# Patient Record
Sex: MALE | Race: WHITE | NOT HISPANIC OR LATINO | URBAN - METROPOLITAN AREA
[De-identification: names, ages, dates, MRNs, and addresses within clinical notes are randomized per-mention and may not be internally consistent; named-entity substitution may affect disease eponyms.]

---

## 2021-04-13 ENCOUNTER — WEB ENCOUNTER (OUTPATIENT)
Dept: URBAN - METROPOLITAN AREA CLINIC 94 | Facility: CLINIC | Age: 41
End: 2021-04-13

## 2021-04-13 ENCOUNTER — LAB OUTSIDE AN ENCOUNTER (OUTPATIENT)
Dept: URBAN - METROPOLITAN AREA CLINIC 94 | Facility: CLINIC | Age: 41
End: 2021-04-13

## 2021-04-13 ENCOUNTER — OFFICE VISIT (OUTPATIENT)
Dept: URBAN - METROPOLITAN AREA CLINIC 94 | Facility: CLINIC | Age: 41
End: 2021-04-13
Payer: MEDICARE

## 2021-04-13 DIAGNOSIS — R60.0 BILATERAL EDEMA OF LOWER EXTREMITY: ICD-10-CM

## 2021-04-13 DIAGNOSIS — F10.10 ALCOHOL ABUSE: ICD-10-CM

## 2021-04-13 DIAGNOSIS — R60.9 EDEMA, UNSPECIFIED TYPE: ICD-10-CM

## 2021-04-13 DIAGNOSIS — K70.31 ALCOHOLIC CIRRHOSIS OF LIVER WITH ASCITES: ICD-10-CM

## 2021-04-13 PROBLEM — 15167005: Status: ACTIVE | Noted: 2021-04-13

## 2021-04-13 PROCEDURE — 99204 OFFICE O/P NEW MOD 45 MIN: CPT | Performed by: INTERNAL MEDICINE

## 2021-04-13 RX ORDER — METOPROLOL TARTRATE 25 MG/1
1 TABLET WITH FOOD TABLET, FILM COATED ORAL TWICE A DAY
Status: ACTIVE | COMMUNITY

## 2021-04-13 RX ORDER — SERTRALINE 50 MG/1
1 TABLET TABLET, FILM COATED ORAL ONCE A DAY
Status: ACTIVE | COMMUNITY

## 2021-04-13 RX ORDER — AMLODIPINE BESYLATE 5 MG/1
1 TABLET TABLET ORAL ONCE A DAY
Status: ACTIVE | COMMUNITY

## 2021-04-13 RX ORDER — SPIRONOLACTONE 50 MG/1
1 TABLET TABLET, FILM COATED ORAL ONCE A DAY
Status: ACTIVE | COMMUNITY

## 2021-04-13 RX ORDER — SPIRONOLACTONE 100 MG/1
1 TABLET TABLET, FILM COATED ORAL ONCE A DAY
Qty: 30 | Refills: 6
End: 2021-11-08

## 2021-04-13 RX ORDER — FAMOTIDINE 40 MG/1
1 TABLET AT BEDTIME TABLET, FILM COATED ORAL ONCE A DAY
Status: ACTIVE | COMMUNITY

## 2021-04-13 RX ORDER — CLONAZEPAM 0.5 MG/1
1 TABLET AT BEDTIME TABLET ORAL
Status: ACTIVE | COMMUNITY

## 2021-04-13 RX ORDER — FUROSEMIDE 40 MG/1
1 TABLET TABLET ORAL ONCE A DAY
Qty: 30 | Refills: 6 | OUTPATIENT

## 2021-04-13 NOTE — PHYSICAL EXAM GASTROINTESTINAL
Abdomen , MODERATE ASCITES, MILD DIFFUSE TENDERNESS. NO REBOUND TENDERNESS. NO PERITONEAL SIGNS. BS +

## 2021-04-13 NOTE — HPI-TODAY'S VISIT:
Pt presents for evaluation of decompensated alcoholic cirrhosis with ascites and edema. Pt has a history of heavy alcohol abuse and unfortunately still continues to drink despite been told on multiple ocassions to quit drinking. Drinks 6 drinks/day. Pt c/o abdominal and leg swelling for several months, which is getting worse. Denies abdominal pain, rectal bleeding, melena or weight loss. Recently evaluated at [facility] ER 4/6/21 for large testicular hydrocele for which he follows with Dr Elliott from Urology. Labs from 4/6/21 showed Bilirubin 4.8, , ALT 53, Alkphos 280. WBC 9.3, hgb 12.5, Plts 145. PT 17.0, inr 1.37. Hepatitis discriminant function 23.  Pt started on aldactone 50 mg/day which ha snot helped with ascites and edema so far.

## 2021-04-13 NOTE — PHYSICAL EXAM CONSTITUTIONAL:
Great to meet you.    We recommend having you sign a release of information to obtain your biopsy results.    We can have you start topical 5% minoxidil foam in the meantime.     You may want to discuss the necessity of the venlafaxine with the physician who prescribed this for you and consider an alternative medication.     Please continue with very good photoprotection for the face to avoid increase in dyspigmentation    well developed, well nourished , in no acute distress , ambulating without difficulty , normal communication ability

## 2021-05-11 ENCOUNTER — LAB OUTSIDE AN ENCOUNTER (OUTPATIENT)
Dept: URBAN - METROPOLITAN AREA CLINIC 94 | Facility: CLINIC | Age: 41
End: 2021-05-11

## 2021-05-11 ENCOUNTER — OFFICE VISIT (OUTPATIENT)
Dept: URBAN - METROPOLITAN AREA CLINIC 94 | Facility: CLINIC | Age: 41
End: 2021-05-11
Payer: MEDICARE

## 2021-05-11 DIAGNOSIS — K70.31 ALCOHOLIC CIRRHOSIS OF LIVER WITH ASCITES: ICD-10-CM

## 2021-05-11 PROCEDURE — 99214 OFFICE O/P EST MOD 30 MIN: CPT | Performed by: INTERNAL MEDICINE

## 2021-05-11 RX ORDER — CLONAZEPAM 0.5 MG/1
1 TABLET AT BEDTIME TABLET ORAL
Status: ACTIVE | COMMUNITY

## 2021-05-11 RX ORDER — SERTRALINE 50 MG/1
1 TABLET TABLET, FILM COATED ORAL ONCE A DAY
Status: ACTIVE | COMMUNITY

## 2021-05-11 RX ORDER — FAMOTIDINE 40 MG/1
1 TABLET AT BEDTIME TABLET, FILM COATED ORAL ONCE A DAY
Status: ACTIVE | COMMUNITY

## 2021-05-11 RX ORDER — AMLODIPINE BESYLATE 5 MG/1
1 TABLET TABLET ORAL ONCE A DAY
Status: ACTIVE | COMMUNITY

## 2021-05-11 RX ORDER — FUROSEMIDE 40 MG/1
1 TABLET TABLET ORAL ONCE A DAY
Qty: 30 | Refills: 6 | Status: ACTIVE | COMMUNITY

## 2021-05-11 RX ORDER — SPIRONOLACTONE 100 MG/1
1 TABLET TABLET, FILM COATED ORAL ONCE A DAY
Qty: 30 | Refills: 6 | Status: ACTIVE | COMMUNITY
End: 2021-11-08

## 2021-05-11 RX ORDER — SPIRONOLACTONE 100 MG/1
1 TABLET TABLET, FILM COATED ORAL TWICE A DAY
Qty: 60 TABLET | Refills: 6

## 2021-05-11 RX ORDER — METOPROLOL TARTRATE 25 MG/1
1 TABLET WITH FOOD TABLET, FILM COATED ORAL TWICE A DAY
Status: ACTIVE | COMMUNITY

## 2021-05-11 NOTE — HPI-TODAY'S VISIT:
Pt presents for evaluation of decompensated alcoholic cirrhosis with ascites and edema. Pt has a history of heavy alcohol abuse and unfortunately still continues to drink despite been told on multiple ocassions to quit drinking. Pt c/o abdominal and leg swelling for several months, which is getting worse. Denies abdominal pain, rectal bleeding, melena or weight loss. Recently evaluated at [facility] ER 4/6/21 for large testicular hydrocele for which he follows with Dr Elliott from Urology.  Labs from 4/6/21 showed Bilirubin 4.8, , ALT 53, Alkphos 280. WBC 9.3, hgb 12.5, Plts 145. PT 17.0, inr 1.37. Hepatitis discriminant function 23.  Pt had f/u CT A/P which showed cirrhosis with large ascites. Lasix was increased to 40 mg/day and aldactone to 100 mg/day. Pt c/o ascites and edema, not getting any better.

## 2021-05-12 LAB
A/G RATIO: 0.8
ALBUMIN: 2.7
ALKALINE PHOSPHATASE: 185
ALT (SGPT): 19
AST (SGOT): 46
BASO (ABSOLUTE): 0.1
BASOS: 2
BILIRUBIN, TOTAL: 1.8
BUN/CREATININE RATIO: 9
BUN: 7
CALCIUM: 8.5
CARBON DIOXIDE, TOTAL: 20
CHLORIDE: 102
CREATININE: 0.79
EGFR IF AFRICN AM: 130
EGFR IF NONAFRICN AM: 112
EOS (ABSOLUTE): 0.6
EOS: 7
GLOBULIN, TOTAL: 3.2
GLUCOSE: 122
HEMATOCRIT: 31.1
HEMATOLOGY COMMENTS:: (no result)
HEMOGLOBIN A1C: 4.5
HEMOGLOBIN: 10.8
IMMATURE CELLS: (no result)
IMMATURE GRANS (ABS): 0
IMMATURE GRANULOCYTES: 1
INR: 1.2
LYMPHS (ABSOLUTE): 1.6
LYMPHS: 19
MCH: 37.2
MCHC: 34.7
MCV: 107
MONOCYTES(ABSOLUTE): 1.1
MONOCYTES: 14
NEUTROPHILS (ABSOLUTE): 4.7
NEUTROPHILS: 57
NRBC: (no result)
PLATELETS: 189
POTASSIUM: 4.6
PROTEIN, TOTAL: 5.9
PROTHROMBIN TIME: 12.5
RBC: 2.9
RDW: 12
SODIUM: 135
WBC: 8.2

## 2021-05-13 ENCOUNTER — OFFICE VISIT (OUTPATIENT)
Dept: URBAN - METROPOLITAN AREA CLINIC 94 | Facility: CLINIC | Age: 41
End: 2021-05-13

## 2021-05-19 ENCOUNTER — TELEPHONE ENCOUNTER (OUTPATIENT)
Dept: URBAN - METROPOLITAN AREA CLINIC 94 | Facility: CLINIC | Age: 41
End: 2021-05-19

## 2021-06-21 ENCOUNTER — OFFICE VISIT (OUTPATIENT)
Dept: URBAN - METROPOLITAN AREA CLINIC 94 | Facility: CLINIC | Age: 41
End: 2021-06-21
Payer: MEDICARE

## 2021-06-21 ENCOUNTER — TELEPHONE ENCOUNTER (OUTPATIENT)
Dept: URBAN - METROPOLITAN AREA CLINIC 96 | Facility: CLINIC | Age: 41
End: 2021-06-21

## 2021-06-21 DIAGNOSIS — K70.31 ALCOHOLIC CIRRHOSIS OF LIVER WITH ASCITES: ICD-10-CM

## 2021-06-21 PROCEDURE — 99203 OFFICE O/P NEW LOW 30 MIN: CPT | Performed by: INTERNAL MEDICINE

## 2021-06-21 RX ORDER — AMLODIPINE BESYLATE 5 MG/1
1 TABLET TABLET ORAL ONCE A DAY
Status: ACTIVE | COMMUNITY

## 2021-06-21 RX ORDER — FUROSEMIDE 40 MG/1
1 TABLET TABLET ORAL ONCE A DAY
Qty: 30 | Refills: 6 | Status: ON HOLD | COMMUNITY

## 2021-06-21 RX ORDER — SPIRONOLACTONE 100 MG/1
1 TABLET TABLET, FILM COATED ORAL TWICE A DAY
Qty: 60 TABLET | Refills: 6 | Status: ACTIVE | COMMUNITY

## 2021-06-21 RX ORDER — FAMOTIDINE 40 MG/1
1 TABLET AT BEDTIME TABLET, FILM COATED ORAL ONCE A DAY
Status: ACTIVE | COMMUNITY

## 2021-06-21 RX ORDER — METOPROLOL TARTRATE 25 MG/1
1 TABLET WITH FOOD TABLET, FILM COATED ORAL TWICE A DAY
Status: ACTIVE | COMMUNITY

## 2021-06-21 RX ORDER — SERTRALINE 50 MG/1
1 TABLET TABLET, FILM COATED ORAL ONCE A DAY
Status: ACTIVE | COMMUNITY

## 2021-06-21 RX ORDER — CLONAZEPAM 0.5 MG/1
1 TABLET AT BEDTIME TABLET ORAL
Status: ACTIVE | COMMUNITY

## 2021-06-21 RX ORDER — FUROSEMIDE 40 MG/1
1 TABLET TABLET ORAL ONCE A DAY
Qty: 30 | Refills: 11 | OUTPATIENT
Start: 2021-06-21

## 2021-06-21 NOTE — PHYSICAL EXAM GASTROINTESTINAL
Abdomen , soft, nontender, very distended and tense no guarding or rigidity , no masses palpable , normal bowel sounds , Liver and Spleen , no hepatomegaly present , no hepatosplenomegaly , liver nontender , spleen not palpable  Has pedal edema

## 2021-06-21 NOTE — HPI-TODAY'S VISIT:
S-Wt went up 3 lb.Still drinks ETOH, tho "less" Belly swollen. Mild abd pain. States "only a little salt" intake. On spironolactone 200/d. Furosemide "not at pharmacy" Dr Crandall advised him no liver transplant unless dry 6 months Pt presents for evaluation of decompensated alcoholic cirrhosis with ascites and edema. Pt has a history of heavy alcohol abuse and unfortunately still continues to drink despite been told on multiple ocassions to quit drinking. Pt c/o abdominal and leg swelling for several months, which is getting worse. Denies abdominal pain, rectal bleeding, melena or weight loss. Recently evaluated at [facility] ER 4/6/21 for large testicular hydrocele for which he follows with Dr Elliott from Urology.  Labs from 4/6/21 showed Bilirubin 4.8, , ALT 53, Alkphos 280. WBC 9.3, hgb 12.5, Plts 145. PT 17.0, inr 1.37. Hepatitis discriminant function 23. MELD on last lab-11 Pt had f/u CT A/P which showed cirrhosis with large ascites. Pt c/o ascites and edema, not getting any better.

## 2021-06-22 LAB
A/G RATIO: 0.7
ALBUMIN: 3
ALKALINE PHOSPHATASE: 200
ALT (SGPT): 16
AST (SGOT): 42
BILIRUBIN, TOTAL: 1.7
BUN/CREATININE RATIO: 8
BUN: 6
CALCIUM: 8.6
CARBON DIOXIDE, TOTAL: 18
CHLORIDE: 95
CREATININE: 0.75
EGFR IF AFRICN AM: 133
EGFR IF NONAFRICN AM: 115
GLOBULIN, TOTAL: 4.2
GLUCOSE: 84
POTASSIUM: 5
PROTEIN, TOTAL: 7.2
SODIUM: 127

## 2021-07-28 ENCOUNTER — LAB OUTSIDE AN ENCOUNTER (OUTPATIENT)
Dept: URBAN - METROPOLITAN AREA CLINIC 94 | Facility: CLINIC | Age: 41
End: 2021-07-28

## 2021-07-28 ENCOUNTER — OFFICE VISIT (OUTPATIENT)
Dept: URBAN - METROPOLITAN AREA CLINIC 94 | Facility: CLINIC | Age: 41
End: 2021-07-28
Payer: MEDICARE

## 2021-07-28 VITALS
HEART RATE: 103 BPM | HEIGHT: 71 IN | WEIGHT: 309 LBS | DIASTOLIC BLOOD PRESSURE: 57 MMHG | BODY MASS INDEX: 43.26 KG/M2 | TEMPERATURE: 98.1 F | SYSTOLIC BLOOD PRESSURE: 134 MMHG

## 2021-07-28 DIAGNOSIS — K70.31 ALCOHOLIC CIRRHOSIS OF LIVER WITH ASCITES: ICD-10-CM

## 2021-07-28 DIAGNOSIS — R18.8 OTHER ASCITES: ICD-10-CM

## 2021-07-28 PROCEDURE — 99214 OFFICE O/P EST MOD 30 MIN: CPT | Performed by: INTERNAL MEDICINE

## 2021-07-28 RX ORDER — METOPROLOL TARTRATE 25 MG/1
1 TABLET WITH FOOD TABLET, FILM COATED ORAL TWICE A DAY
Status: ACTIVE | COMMUNITY

## 2021-07-28 RX ORDER — FUROSEMIDE 40 MG/1
1 TABLET TABLET ORAL ONCE A DAY
Qty: 30 | Refills: 6 | Status: DISCONTINUED | COMMUNITY

## 2021-07-28 RX ORDER — CLONAZEPAM 0.5 MG/1
1 TABLET AT BEDTIME TABLET ORAL
Status: ACTIVE | COMMUNITY

## 2021-07-28 RX ORDER — SPIRONOLACTONE 100 MG/1
1 TABLET TABLET, FILM COATED ORAL TWICE A DAY
Qty: 60 TABLET | Refills: 6 | Status: ACTIVE | COMMUNITY

## 2021-07-28 RX ORDER — AMLODIPINE BESYLATE 5 MG/1
1 TABLET TABLET ORAL ONCE A DAY
Status: ACTIVE | COMMUNITY

## 2021-07-28 RX ORDER — SERTRALINE 50 MG/1
1 TABLET TABLET, FILM COATED ORAL ONCE A DAY
Status: ACTIVE | COMMUNITY

## 2021-07-28 RX ORDER — FAMOTIDINE 40 MG/1
1 TABLET AT BEDTIME TABLET, FILM COATED ORAL ONCE A DAY
Status: ACTIVE | COMMUNITY

## 2021-07-28 RX ORDER — FUROSEMIDE 40 MG/1
1 TABLET TABLET ORAL ONCE A DAY
Qty: 30 | Refills: 11 | Status: ACTIVE | COMMUNITY
Start: 2021-06-21

## 2021-07-28 NOTE — HPI-TODAY'S VISIT:
Pt presents for evaluation of decompensated alcoholic cirrhosis with ascites and edema. Pt has a history of heavy alcohol abuse and unfortunately still continues to drink despite been told on multiple ocassions to quit drinking. Pt c/o abdominal and leg swelling for several months, which is getting worse. Denies abdominal pain, rectal bleeding, melena or weight loss. Recently evaluated at [facility] ER 4/6/21 for large testicular hydrocele for which he follows with Dr Elliott from Urology.  Labs from 4/6/21 showed Bilirubin 4.8, , ALT 53, Alkphos 280. WBC 9.3, hgb 12.5, Plts 145. PT 17.0, inr 1.37. Hepatitis discriminant function 23.  Pt did not qualify for liver transplant due to active ETOH abuse. Pt had f/u CT A/P which showed cirrhosis with large ascites. Lasix was increased to 40 mg/day and aldactone to 100 mg/day. PT c/o tense ascites .

## 2021-07-29 ENCOUNTER — LAB OUTSIDE AN ENCOUNTER (OUTPATIENT)
Dept: URBAN - METROPOLITAN AREA CLINIC 92 | Facility: CLINIC | Age: 41
End: 2021-07-29

## 2021-07-29 ENCOUNTER — TELEPHONE ENCOUNTER (OUTPATIENT)
Dept: URBAN - METROPOLITAN AREA CLINIC 92 | Facility: CLINIC | Age: 41
End: 2021-07-29

## 2021-07-29 LAB
A/G RATIO: 0.8
ALBUMIN: 3.1
ALKALINE PHOSPHATASE: 155
ALT (SGPT): 12
AST (SGOT): 33
BASO (ABSOLUTE): 0.1
BASOS: 1
BILIRUBIN, TOTAL: 1.9
BUN/CREATININE RATIO: 8
BUN: 6
CALCIUM: 8.3
CARBON DIOXIDE, TOTAL: 19
CHLORIDE: 93
CREATININE: 0.8
EGFR IF AFRICN AM: 129
EGFR IF NONAFRICN AM: 112
EOS (ABSOLUTE): 0.5
EOS: 5
GLOBULIN, TOTAL: 4
GLUCOSE: 81
HEMATOCRIT: 26.8
HEMATOLOGY COMMENTS:: (no result)
HEMOGLOBIN: 9.3
IMMATURE CELLS: (no result)
IMMATURE GRANS (ABS): 0.1
IMMATURE GRANULOCYTES: 1
INR: 1.2
LYMPHS (ABSOLUTE): 1.2
LYMPHS: 13
MCH: 38.9
MCHC: 34.7
MCV: 112
MONOCYTES(ABSOLUTE): 1.2
MONOCYTES: 13
NEUTROPHILS (ABSOLUTE): 6.1
NEUTROPHILS: 67
NRBC: (no result)
PLATELETS: 213
POTASSIUM: 4.5
PROTEIN, TOTAL: 7.1
PROTHROMBIN TIME: 12.9
RBC: 2.39
RDW: 12.4
SODIUM: 127
WBC: 9.1

## 2021-07-30 LAB
FERRITIN, SERUM: 297
FOLATE (FOLIC ACID), SERUM: 3.4
IRON BIND.CAP.(TIBC): 171
IRON SATURATION: 53
IRON: 90
REQUEST PROBLEM: (no result)
RETICULOCYTE COUNT: (no result)
UIBC: 81
VITAMIN B12: 673

## 2021-08-30 ENCOUNTER — OFFICE VISIT (OUTPATIENT)
Dept: URBAN - METROPOLITAN AREA CLINIC 94 | Facility: CLINIC | Age: 41
End: 2021-08-30

## 2021-09-13 ENCOUNTER — TELEPHONE ENCOUNTER (OUTPATIENT)
Dept: URBAN - METROPOLITAN AREA CLINIC 94 | Facility: CLINIC | Age: 41
End: 2021-09-13

## 2021-09-20 ENCOUNTER — TELEPHONE ENCOUNTER (OUTPATIENT)
Dept: URBAN - METROPOLITAN AREA CLINIC 94 | Facility: CLINIC | Age: 41
End: 2021-09-20

## 2021-09-21 PROBLEM — 389026000: Status: ACTIVE | Noted: 2021-07-28

## 2021-09-27 ENCOUNTER — TELEPHONE ENCOUNTER (OUTPATIENT)
Dept: URBAN - METROPOLITAN AREA CLINIC 94 | Facility: CLINIC | Age: 41
End: 2021-09-27

## 2021-10-21 ENCOUNTER — OFFICE VISIT (OUTPATIENT)
Dept: URBAN - METROPOLITAN AREA CLINIC 94 | Facility: CLINIC | Age: 41
End: 2021-10-21

## 2021-10-27 ENCOUNTER — OFFICE VISIT (OUTPATIENT)
Dept: URBAN - METROPOLITAN AREA CLINIC 94 | Facility: CLINIC | Age: 41
End: 2021-10-27
Payer: MEDICARE

## 2021-10-27 ENCOUNTER — TELEPHONE ENCOUNTER (OUTPATIENT)
Dept: URBAN - METROPOLITAN AREA CLINIC 94 | Facility: CLINIC | Age: 41
End: 2021-10-27

## 2021-10-27 VITALS
TEMPERATURE: 98.1 F | HEART RATE: 102 BPM | DIASTOLIC BLOOD PRESSURE: 71 MMHG | WEIGHT: 269 LBS | HEIGHT: 71 IN | SYSTOLIC BLOOD PRESSURE: 118 MMHG | BODY MASS INDEX: 37.66 KG/M2

## 2021-10-27 DIAGNOSIS — K70.31 ALCOHOLIC CIRRHOSIS OF LIVER WITH ASCITES: ICD-10-CM

## 2021-10-27 DIAGNOSIS — R13.10 DYSPHAGIA, UNSPECIFIED TYPE: ICD-10-CM

## 2021-10-27 PROBLEM — 1082601000119104: Status: ACTIVE | Noted: 2021-04-13

## 2021-10-27 PROCEDURE — 99214 OFFICE O/P EST MOD 30 MIN: CPT | Performed by: INTERNAL MEDICINE

## 2021-10-27 RX ORDER — SPIRONOLACTONE 100 MG/1
1 TABLET TABLET, FILM COATED ORAL TWICE A DAY
Qty: 60 TABLET | Refills: 6 | Status: ACTIVE | COMMUNITY

## 2021-10-27 RX ORDER — CHOLECALCIFEROL (VITAMIN D3) 125 MCG
AS DIRECTED TABLET ORAL
Status: ACTIVE | COMMUNITY

## 2021-10-27 RX ORDER — ALBUTEROL SULFATE 90 UG/1
1 PUFF AS NEEDED AEROSOL, METERED RESPIRATORY (INHALATION)
Status: ACTIVE | COMMUNITY

## 2021-10-27 RX ORDER — METOPROLOL TARTRATE 25 MG/1
1 TABLET WITH FOOD TABLET, FILM COATED ORAL TWICE A DAY
Status: ACTIVE | COMMUNITY

## 2021-10-27 RX ORDER — FAMOTIDINE 40 MG/1
1 TABLET AT BEDTIME TABLET, FILM COATED ORAL ONCE A DAY
Status: ACTIVE | COMMUNITY

## 2021-10-27 RX ORDER — FUROSEMIDE 40 MG/1
1 TABLET TABLET ORAL TWICE PER DAY
Refills: 11 | Status: ACTIVE | COMMUNITY
Start: 2021-06-21

## 2021-10-27 RX ORDER — AMLODIPINE BESYLATE 5 MG/1
1 TABLET TABLET ORAL ONCE A DAY
Status: ACTIVE | COMMUNITY

## 2021-10-27 RX ORDER — CLONAZEPAM 0.5 MG/1
1 TABLET AT BEDTIME TABLET ORAL
Status: ACTIVE | COMMUNITY

## 2021-10-27 RX ORDER — SERTRALINE 50 MG/1
1 TABLET TABLET, FILM COATED ORAL ONCE A DAY
Status: ACTIVE | COMMUNITY

## 2021-10-27 NOTE — HPI-TODAY'S VISIT:
Pt presents for evaluation of decompensated alcoholic cirrhosis with ascites and edema. Pt has a history of heavy alcohol abuse and unfortunately continued to drink despite been told on multiple ocassions to quit drinking. Pt presented with abdominal and leg swelling for several months. Denied abdominal pain, rectal bleeding, melena or weight loss. Labs from 4/6/21 showed Bilirubin 4.8, , ALT 53, Alkphos 280. WBC 9.3, hgb 12.5, Plts 145. PT 17.0, inr 1.37. Hepatitis discriminant function was 23.  Pt had f/u CT A/P which showed cirrhosis with large ascites. Lasix was increased to 40 mg/day and aldactone to 100 mg/day at last visit. Repeat labs 7/29/21: Na 127, K 4.5, Cr 0.80, BUN 6, Albumin 3.1, Bilirubin 1.9, Alkphos 155, AST 33, ALT 12, INR 1.2. WBC 9.1, HGB 9.3, PLts 213.  Iron, B12 and folate WNL. Repeat MELD 7/29/21:  11. Pt requiring paracentesis every 2 weeks Pt feels much better now. Edema, ascites improved a lot. Energy level better. Has " almost stopped alcohol". Pt c/o GERD with intermittent dysphagia for solids. Pt on pepcid. Pt never had EGD.

## 2021-10-28 PROBLEM — 40739000: Status: ACTIVE | Noted: 2021-10-27

## 2021-11-03 ENCOUNTER — TELEPHONE ENCOUNTER (OUTPATIENT)
Dept: URBAN - METROPOLITAN AREA CLINIC 94 | Facility: CLINIC | Age: 41
End: 2021-11-03

## 2021-12-15 ENCOUNTER — CLAIMS CREATED FROM THE CLAIM WINDOW (OUTPATIENT)
Dept: URBAN - METROPOLITAN AREA MEDICAL CENTER 34 | Facility: MEDICAL CENTER | Age: 41
End: 2021-12-15
Payer: MEDICARE

## 2021-12-15 DIAGNOSIS — K76.6 CLINICALLY SIGNIFICANT PORTAL HYPERTENSION: ICD-10-CM

## 2021-12-15 DIAGNOSIS — K22.10 BARRETT'S ESOPHAGEAL ULCERATION: ICD-10-CM

## 2021-12-15 DIAGNOSIS — K31.89 ACQUIRED DEFORMITY OF DUODENUM: ICD-10-CM

## 2021-12-15 DIAGNOSIS — I85.10 ESOPH VARICE OTHER DIS: ICD-10-CM

## 2021-12-15 PROCEDURE — 43235 EGD DIAGNOSTIC BRUSH WASH: CPT | Performed by: INTERNAL MEDICINE

## 2021-12-16 ENCOUNTER — TELEPHONE ENCOUNTER (OUTPATIENT)
Dept: URBAN - METROPOLITAN AREA CLINIC 94 | Facility: CLINIC | Age: 41
End: 2021-12-16

## 2022-01-05 ENCOUNTER — OFFICE VISIT (OUTPATIENT)
Dept: URBAN - METROPOLITAN AREA CLINIC 94 | Facility: CLINIC | Age: 42
End: 2022-01-05
Payer: MEDICARE

## 2022-01-05 VITALS
TEMPERATURE: 99.7 F | BODY MASS INDEX: 31.36 KG/M2 | WEIGHT: 224 LBS | SYSTOLIC BLOOD PRESSURE: 132 MMHG | HEIGHT: 71 IN | HEART RATE: 100 BPM | DIASTOLIC BLOOD PRESSURE: 80 MMHG

## 2022-01-05 DIAGNOSIS — K31.84 GASTROPARESIS: ICD-10-CM

## 2022-01-05 DIAGNOSIS — K70.31 ALCOHOLIC CIRRHOSIS OF LIVER WITH ASCITES: ICD-10-CM

## 2022-01-05 PROBLEM — 235675006: Status: ACTIVE | Noted: 2022-01-05

## 2022-01-05 PROCEDURE — 99214 OFFICE O/P EST MOD 30 MIN: CPT | Performed by: INTERNAL MEDICINE

## 2022-01-05 RX ORDER — ALBUTEROL SULFATE 90 UG/1
1 PUFF AS NEEDED AEROSOL, METERED RESPIRATORY (INHALATION)
COMMUNITY

## 2022-01-05 RX ORDER — CHOLECALCIFEROL (VITAMIN D3) 125 MCG
AS DIRECTED TABLET ORAL
COMMUNITY

## 2022-01-05 RX ORDER — OMEPRAZOLE 40 MG/1
TAKE 1 CAPSULE (40 MG) BY ORAL ROUTE ONCE DAILY BEFORE A MEAL FOR 90 DAYS CAPSULE, DELAYED RELEASE PELLETS ORAL 1
Qty: 90 | Refills: 4 | OUTPATIENT

## 2022-01-05 RX ORDER — CLONAZEPAM 0.5 MG/1
1 TABLET AT BEDTIME TABLET ORAL
COMMUNITY

## 2022-01-05 RX ORDER — METOPROLOL TARTRATE 25 MG/1
1 TABLET WITH FOOD TABLET, FILM COATED ORAL TWICE A DAY
COMMUNITY

## 2022-01-05 RX ORDER — SERTRALINE 50 MG/1
1 TABLET TABLET, FILM COATED ORAL ONCE A DAY
COMMUNITY

## 2022-01-05 RX ORDER — FUROSEMIDE 40 MG/1
1 TABLET TABLET ORAL TWICE PER DAY
Refills: 11 | COMMUNITY
Start: 2021-06-21

## 2022-01-05 RX ORDER — AMLODIPINE BESYLATE 5 MG/1
1 TABLET TABLET ORAL ONCE A DAY
COMMUNITY

## 2022-01-05 RX ORDER — FAMOTIDINE 40 MG/1
1 TABLET AT BEDTIME TABLET, FILM COATED ORAL ONCE A DAY
COMMUNITY

## 2022-01-05 RX ORDER — SPIRONOLACTONE 100 MG/1
1 TABLET TABLET, FILM COATED ORAL TWICE A DAY
Qty: 60 TABLET | Refills: 6 | COMMUNITY

## 2022-01-05 NOTE — HPI-TODAY'S VISIT:
Pt presents for evaluation of decompensated alcoholic cirrhosis with ascites and edema. Pt has a history of heavy alcohol abuse and unfortunately continued to drink despite been told on multiple ocassions to quit drinking. Pt presented with abdominal and leg swelling for several months. Denied abdominal pain, rectal bleeding, melena or weight loss. Labs from 4/6/21 showed Bilirubin 4.8, , ALT 53, Alkphos 280. WBC 9.3, hgb 12.5, Plts 145. PT 17.0, inr 1.37. Hepatitis discriminant function was 23.  Pt had f/u CT A/P which showed cirrhosis with large ascites. Lasix was increased to 40 mg/day and aldactone to 100 mg/day at last visit. Repeat labs 7/29/21: Na 127, K 4.5, Cr 0.80, BUN 6, Albumin 3.1, Bilirubin 1.9, Alkphos 155, AST 33, ALT 12, INR 1.2. WBC 9.1, HGB 9.3, PLts 213.  Iron, B12 and folate WNL. Repeat MELD 7/29/21:  11. Pt requiring paracentesis every 3-4  weeks Pt feels much better now. Edema, ascites improved a lot. Energy level better. Has " almost stopped alcohol". Recent EGD 12/15/21: -Grade I esophageal varices without stigmata of bleeding, too small to be banded -Irregular Z line, with LA grade B erosive esophagitis, not biopsied due to presence of esophageal varices. -Large amount of retained food in stomach suggestive of gastroparesis -Moderate portal hypertensive gastropathy. CBC, CMP 12/13/21: STABLE ( reviewed in EPIC).

## 2022-02-14 ENCOUNTER — ERX REFILL RESPONSE (OUTPATIENT)
Dept: URBAN - METROPOLITAN AREA CLINIC 52 | Facility: CLINIC | Age: 42
End: 2022-02-14

## 2022-02-14 RX ORDER — SPIRONOLACTONE 100 MG/1
TAKE ONE TABLET BY MOUTH TWICE A DAY TABLET, FILM COATED ORAL
Qty: 60 TABLET | Refills: 1 | OUTPATIENT

## 2022-02-14 RX ORDER — SPIRONOLACTONE 100 MG/1
1 TABLET TABLET, FILM COATED ORAL TWICE A DAY
Qty: 60 TABLET | Refills: 6 | OUTPATIENT

## 2022-03-12 ENCOUNTER — ERX REFILL RESPONSE (OUTPATIENT)
Dept: URBAN - METROPOLITAN AREA CLINIC 52 | Facility: CLINIC | Age: 42
End: 2022-03-12

## 2022-03-12 RX ORDER — SPIRONOLACTONE 100 MG/1
TAKE ONE TABLET BY MOUTH TWICE A DAY TABLET, FILM COATED ORAL
Qty: 60 TABLET | Refills: 0 | OUTPATIENT

## 2022-03-12 RX ORDER — SPIRONOLACTONE 100 MG/1
TAKE ONE TABLET BY MOUTH TWICE A DAY TABLET, FILM COATED ORAL
Qty: 60 TABLET | Refills: 1 | OUTPATIENT

## 2022-04-22 ENCOUNTER — ERX REFILL RESPONSE (OUTPATIENT)
Dept: URBAN - METROPOLITAN AREA CLINIC 52 | Facility: CLINIC | Age: 42
End: 2022-04-22

## 2022-04-22 RX ORDER — SPIRONOLACTONE 100 MG/1
TAKE ONE TABLET BY MOUTH TWICE A DAY TABLET, FILM COATED ORAL
Qty: 60 TABLET | Refills: 1 | OUTPATIENT

## 2022-04-22 RX ORDER — SPIRONOLACTONE 100 MG/1
TAKE ONE TABLET BY MOUTH TWICE A DAY TABLET, FILM COATED ORAL
Qty: 60 TABLET | Refills: 0 | OUTPATIENT

## 2022-05-25 ENCOUNTER — ERX REFILL RESPONSE (OUTPATIENT)
Dept: URBAN - METROPOLITAN AREA CLINIC 52 | Facility: CLINIC | Age: 42
End: 2022-05-25

## 2022-05-25 RX ORDER — SPIRONOLACTONE 100 MG/1
TAKE ONE TABLET BY MOUTH TWICE A DAY TABLET, FILM COATED ORAL
Qty: 60 TABLET | Refills: 0 | OUTPATIENT

## 2022-05-25 RX ORDER — SPIRONOLACTONE 100 MG/1
TAKE ONE TABLET BY MOUTH TWICE A DAY TABLET, FILM COATED ORAL
Qty: 60 TABLET | Refills: 1 | OUTPATIENT

## 2022-06-02 ENCOUNTER — WEB ENCOUNTER (OUTPATIENT)
Dept: URBAN - METROPOLITAN AREA CLINIC 94 | Facility: CLINIC | Age: 42
End: 2022-06-02

## 2022-06-02 ENCOUNTER — OFFICE VISIT (OUTPATIENT)
Dept: URBAN - METROPOLITAN AREA CLINIC 94 | Facility: CLINIC | Age: 42
End: 2022-06-02
Payer: MEDICARE

## 2022-06-02 VITALS
SYSTOLIC BLOOD PRESSURE: 116 MMHG | WEIGHT: 249 LBS | HEART RATE: 88 BPM | BODY MASS INDEX: 34.86 KG/M2 | TEMPERATURE: 98.6 F | DIASTOLIC BLOOD PRESSURE: 76 MMHG | HEIGHT: 71 IN

## 2022-06-02 DIAGNOSIS — K70.31 ALCOHOLIC CIRRHOSIS OF LIVER WITH ASCITES: ICD-10-CM

## 2022-06-02 PROBLEM — 420054005: Status: ACTIVE | Noted: 2021-04-13

## 2022-06-02 PROCEDURE — 99214 OFFICE O/P EST MOD 30 MIN: CPT | Performed by: INTERNAL MEDICINE

## 2022-06-02 RX ORDER — FUROSEMIDE 40 MG/1
1 TABLET TABLET ORAL TWICE PER DAY
Refills: 11 | Status: ACTIVE | COMMUNITY
Start: 2021-06-21

## 2022-06-02 RX ORDER — CHOLECALCIFEROL (VITAMIN D3) 125 MCG
AS DIRECTED TABLET ORAL
Status: ACTIVE | COMMUNITY

## 2022-06-02 RX ORDER — AMLODIPINE BESYLATE 5 MG/1
1 TABLET TABLET ORAL ONCE A DAY
Status: DISCONTINUED | COMMUNITY

## 2022-06-02 RX ORDER — CLONAZEPAM 0.5 MG/1
1 TABLET AT BEDTIME TABLET ORAL
Status: ACTIVE | COMMUNITY

## 2022-06-02 RX ORDER — FAMOTIDINE 40 MG/1
1 TABLET AT BEDTIME TABLET, FILM COATED ORAL ONCE A DAY
Status: DISCONTINUED | COMMUNITY

## 2022-06-02 RX ORDER — ALBUTEROL SULFATE 90 UG/1
1 PUFF AS NEEDED AEROSOL, METERED RESPIRATORY (INHALATION)
Status: ACTIVE | COMMUNITY

## 2022-06-02 RX ORDER — SERTRALINE 50 MG/1
1 TABLET TABLET, FILM COATED ORAL ONCE A DAY
Status: ACTIVE | COMMUNITY

## 2022-06-02 RX ORDER — SPIRONOLACTONE 100 MG/1
TAKE ONE TABLET BY MOUTH TWICE A DAY TABLET, FILM COATED ORAL
Qty: 60 TABLET | Refills: 1 | Status: ACTIVE | COMMUNITY

## 2022-06-02 RX ORDER — METOPROLOL TARTRATE 25 MG/1
1 TABLET WITH FOOD TABLET, FILM COATED ORAL TWICE A DAY
Status: ACTIVE | COMMUNITY

## 2022-06-02 RX ORDER — OMEPRAZOLE 40 MG/1
TAKE 1 CAPSULE (40 MG) BY ORAL ROUTE ONCE DAILY BEFORE A MEAL FOR 90 DAYS CAPSULE, DELAYED RELEASE PELLETS ORAL 1
Qty: 90 | Refills: 4 | Status: ACTIVE | COMMUNITY

## 2022-06-06 ENCOUNTER — OFFICE VISIT (OUTPATIENT)
Dept: URBAN - METROPOLITAN AREA SURGERY CENTER 17 | Facility: SURGERY CENTER | Age: 42
End: 2022-06-06

## 2022-06-22 ENCOUNTER — ERX REFILL RESPONSE (OUTPATIENT)
Dept: URBAN - METROPOLITAN AREA CLINIC 52 | Facility: CLINIC | Age: 42
End: 2022-06-22

## 2022-06-22 RX ORDER — SPIRONOLACTONE 100 MG/1
TAKE ONE TABLET BY MOUTH TWICE A DAY TABLET, FILM COATED ORAL
Qty: 60 TABLET | Refills: 0 | OUTPATIENT

## 2022-08-03 ENCOUNTER — ERX REFILL RESPONSE (OUTPATIENT)
Dept: URBAN - METROPOLITAN AREA CLINIC 52 | Facility: CLINIC | Age: 42
End: 2022-08-03

## 2022-08-03 RX ORDER — SPIRONOLACTONE 100 MG/1
TAKE ONE TABLET BY MOUTH TWICE A DAY TABLET, FILM COATED ORAL
Qty: 60 TABLET | Refills: 0 | OUTPATIENT

## 2022-11-30 ENCOUNTER — OFFICE VISIT (OUTPATIENT)
Dept: URBAN - METROPOLITAN AREA CLINIC 94 | Facility: CLINIC | Age: 42
End: 2022-11-30

## 2022-12-07 ENCOUNTER — LAB OUTSIDE AN ENCOUNTER (OUTPATIENT)
Dept: URBAN - METROPOLITAN AREA CLINIC 94 | Facility: CLINIC | Age: 42
End: 2022-12-07

## 2022-12-07 ENCOUNTER — OFFICE VISIT (OUTPATIENT)
Dept: URBAN - METROPOLITAN AREA CLINIC 94 | Facility: CLINIC | Age: 42
End: 2022-12-07
Payer: MEDICARE

## 2022-12-07 VITALS
TEMPERATURE: 97.5 F | DIASTOLIC BLOOD PRESSURE: 74 MMHG | BODY MASS INDEX: 39.2 KG/M2 | SYSTOLIC BLOOD PRESSURE: 129 MMHG | WEIGHT: 280 LBS | HEART RATE: 104 BPM | HEIGHT: 71 IN

## 2022-12-07 DIAGNOSIS — K70.30 ALCOHOLIC CIRRHOSIS, UNSPECIFIED WHETHER ASCITES PRESENT: ICD-10-CM

## 2022-12-07 PROBLEM — 420054005: Status: ACTIVE | Noted: 2022-12-07

## 2022-12-07 PROCEDURE — 99214 OFFICE O/P EST MOD 30 MIN: CPT | Performed by: INTERNAL MEDICINE

## 2022-12-07 RX ORDER — SERTRALINE 50 MG/1
1 TABLET TABLET, FILM COATED ORAL ONCE A DAY
Status: ACTIVE | COMMUNITY

## 2022-12-07 RX ORDER — FUROSEMIDE 40 MG/1
1 TABLET TABLET ORAL TWICE PER DAY
Refills: 11 | Status: ACTIVE | COMMUNITY
Start: 2021-06-21

## 2022-12-07 RX ORDER — OMEPRAZOLE 40 MG/1
TAKE 1 CAPSULE (40 MG) BY ORAL ROUTE ONCE DAILY BEFORE A MEAL FOR 90 DAYS CAPSULE, DELAYED RELEASE PELLETS ORAL 1
Qty: 90 | Refills: 4 | Status: ACTIVE | COMMUNITY

## 2022-12-07 RX ORDER — SODIUM BICARBONATE 650 MG/1
TABLET ORAL
Qty: 90 TABLET | Status: ACTIVE | COMMUNITY

## 2022-12-07 RX ORDER — ALBUTEROL SULFATE 90 UG/1
1 PUFF AS NEEDED AEROSOL, METERED RESPIRATORY (INHALATION)
Status: ACTIVE | COMMUNITY

## 2022-12-07 RX ORDER — SPIRONOLACTONE 100 MG/1
TAKE ONE TABLET BY MOUTH TWICE A DAY TABLET, FILM COATED ORAL
Qty: 60 TABLET | Refills: 0 | Status: ACTIVE | COMMUNITY

## 2022-12-07 RX ORDER — CLONAZEPAM 0.5 MG/1
1 TABLET AT BEDTIME TABLET ORAL
Status: ACTIVE | COMMUNITY

## 2022-12-07 RX ORDER — METOPROLOL TARTRATE 25 MG/1
1 TABLET WITH FOOD TABLET, FILM COATED ORAL TWICE A DAY
Status: ACTIVE | COMMUNITY

## 2022-12-07 RX ORDER — CHOLECALCIFEROL (VITAMIN D3) 125 MCG
AS DIRECTED TABLET ORAL
Status: ACTIVE | COMMUNITY

## 2022-12-07 NOTE — HPI-TODAY'S VISIT:
Pt presents for evaluation of decompensated alcoholic cirrhosis with ascites and edema. Pt has a history of heavy alcohol abuse and unfortunately continued to drink despite been told on multiple ocassions to quit drinking. Pt presented with abdominal and leg swelling for several months. Denied abdominal pain, rectal bleeding, melena or weight loss. Labs from 4/6/21 showed Bilirubin 4.8, , ALT 53, Alkphos 280. WBC 9.3, hgb 12.5, Plts 145. PT 17.0, inr 1.37. Hepatitis discriminant function was 23.  Pt had f/u CT A/P which showed cirrhosis with large ascites. Lasix was increased to 40 mg/day and aldactone to 100 mg/day at last visit. Repeat labs 7/29/21: Na 127, K 4.5, Cr 0.80, BUN 6, Albumin 3.1, Bilirubin 1.9, Alkphos 155, AST 33, ALT 12, INR 1.2. WBC 9.1, HGB 9.3, PLts 213.  Iron, B12 and folate WNL. Repeat MELD 7/29/21:  11. Pt requiring paracentesis every 3-4  weeks  EGD 12/15/21: -Grade I esophageal varices without stigmata of bleeding, too small to be banded -Irregular Z line, with LA grade B erosive esophagitis, not biopsied due to presence of esophageal varices. -Large amount of retained food in stomach suggestive of gastroparesis -Moderate portal hypertensive gastropathy.  CBC, CMP 12/13/21: STABLE ( reviewed in EPIC). Labs 4/5/22: CMP is WNL. LFTs WNL. WBC 7.9. HGB 12.2. Plts 280.  Pt feels much better now. Edema, ascites improved a lot. Energy level better. Has " almost stopped alcohol". Pt states that recent labs by Dr Perez from nephrology were WNL. Results not available Pt denies any GI symptoms at this time.

## 2023-01-31 ENCOUNTER — ERX REFILL RESPONSE (OUTPATIENT)
Dept: URBAN - METROPOLITAN AREA CLINIC 52 | Facility: CLINIC | Age: 43
End: 2023-01-31

## 2023-01-31 RX ORDER — OMEPRAZOLE 40 MG/1
TAKE 1 CAPSULE (40 MG) BY ORAL ROUTE ONCE DAILY BEFORE A MEAL FOR 90 DAYS CAPSULE, DELAYED RELEASE PELLETS ORAL 1
Qty: 90 | Refills: 4 | OUTPATIENT

## 2023-01-31 RX ORDER — OMEPRAZOLE 40 MG/1
TAKE ONE CAPSULE BY MOUTH DAILY BEFORE A MEAL CAPSULE, DELAYED RELEASE ORAL
Qty: 90 CAPSULE | Refills: 0 | OUTPATIENT

## 2023-03-23 ENCOUNTER — LAB OUTSIDE AN ENCOUNTER (OUTPATIENT)
Dept: URBAN - METROPOLITAN AREA CLINIC 94 | Facility: CLINIC | Age: 43
End: 2023-03-23

## 2023-03-23 ENCOUNTER — TELEPHONE ENCOUNTER (OUTPATIENT)
Dept: URBAN - METROPOLITAN AREA CLINIC 94 | Facility: CLINIC | Age: 43
End: 2023-03-23

## 2023-05-01 ENCOUNTER — ERX REFILL RESPONSE (OUTPATIENT)
Dept: URBAN - METROPOLITAN AREA CLINIC 52 | Facility: CLINIC | Age: 43
End: 2023-05-01

## 2023-05-01 RX ORDER — OMEPRAZOLE 40 MG/1
TAKE ONE CAPSULE BY MOUTH DAILY BEFORE A MEAL CAPSULE, DELAYED RELEASE ORAL
Qty: 90 CAPSULE | Refills: 0 | OUTPATIENT

## 2023-05-10 ENCOUNTER — OUT OF OFFICE VISIT (OUTPATIENT)
Dept: URBAN - METROPOLITAN AREA MEDICAL CENTER 34 | Facility: MEDICAL CENTER | Age: 43
End: 2023-05-10

## 2023-05-10 ENCOUNTER — CLAIMS CREATED FROM THE CLAIM WINDOW (OUTPATIENT)
Dept: URBAN - METROPOLITAN AREA MEDICAL CENTER 34 | Facility: MEDICAL CENTER | Age: 43
End: 2023-05-10
Payer: MEDICARE

## 2023-05-10 DIAGNOSIS — I85.10 ESOPH VARICE OTHER DIS: ICD-10-CM

## 2023-05-10 DIAGNOSIS — K22.89 OTHER SPECIFIED DISEASE OF ESOPHAGUS: ICD-10-CM

## 2023-05-10 DIAGNOSIS — R74.01 ABNORMAL/ELEVATED TRANSAMINASE (SGOT, AMINOTRANSFERASE): ICD-10-CM

## 2023-05-10 DIAGNOSIS — K76.6 CLINICALLY SIGNIFICANT PORTAL HYPERTENSION: ICD-10-CM

## 2023-05-10 DIAGNOSIS — K92.0 BLOODY EMESIS: ICD-10-CM

## 2023-05-10 DIAGNOSIS — K92.1 ACUTE MELENA: ICD-10-CM

## 2023-05-10 DIAGNOSIS — K31.89 ACQUIRED DEFORMITY OF DUODENUM: ICD-10-CM

## 2023-05-10 DIAGNOSIS — R74.8 ABNORMAL ALKALINE PHOSPHATASE TEST: ICD-10-CM

## 2023-05-10 PROCEDURE — G8427 DOCREV CUR MEDS BY ELIG CLIN: HCPCS | Performed by: INTERNAL MEDICINE

## 2023-05-10 PROCEDURE — 99232 SBSQ HOSP IP/OBS MODERATE 35: CPT | Performed by: INTERNAL MEDICINE

## 2023-05-10 PROCEDURE — 43235 EGD DIAGNOSTIC BRUSH WASH: CPT | Performed by: INTERNAL MEDICINE

## 2023-05-10 PROCEDURE — 99223 1ST HOSP IP/OBS HIGH 75: CPT | Performed by: INTERNAL MEDICINE

## 2023-05-11 ENCOUNTER — CLAIMS CREATED FROM THE CLAIM WINDOW (OUTPATIENT)
Dept: URBAN - METROPOLITAN AREA MEDICAL CENTER 34 | Facility: MEDICAL CENTER | Age: 43
End: 2023-05-11
Payer: MEDICARE

## 2023-05-11 ENCOUNTER — OUT OF OFFICE VISIT (OUTPATIENT)
Dept: URBAN - METROPOLITAN AREA MEDICAL CENTER 34 | Facility: MEDICAL CENTER | Age: 43
End: 2023-05-11

## 2023-05-11 DIAGNOSIS — I85.10 ESOPH VARICE OTHER DIS: ICD-10-CM

## 2023-05-11 DIAGNOSIS — R18.8 ABDOMINAL ASCITES: ICD-10-CM

## 2023-05-11 DIAGNOSIS — K31.89 ACQUIRED DEFORMITY OF DUODENUM: ICD-10-CM

## 2023-05-11 DIAGNOSIS — K76.6 CLINICALLY SIGNIFICANT PORTAL HYPERTENSION: ICD-10-CM

## 2023-05-11 PROCEDURE — 99233 SBSQ HOSP IP/OBS HIGH 50: CPT | Performed by: INTERNAL MEDICINE

## 2023-06-07 ENCOUNTER — OFFICE VISIT (OUTPATIENT)
Dept: URBAN - METROPOLITAN AREA CLINIC 94 | Facility: CLINIC | Age: 43
End: 2023-06-07
Payer: MEDICARE

## 2023-06-07 ENCOUNTER — LAB OUTSIDE AN ENCOUNTER (OUTPATIENT)
Dept: URBAN - METROPOLITAN AREA CLINIC 94 | Facility: CLINIC | Age: 43
End: 2023-06-07

## 2023-06-07 VITALS
WEIGHT: 290 LBS | DIASTOLIC BLOOD PRESSURE: 71 MMHG | TEMPERATURE: 97.7 F | SYSTOLIC BLOOD PRESSURE: 112 MMHG | BODY MASS INDEX: 40.6 KG/M2 | HEART RATE: 103 BPM | HEIGHT: 71 IN

## 2023-06-07 DIAGNOSIS — K92.0 HEMATEMESIS, UNSPECIFIED WHETHER NAUSEA PRESENT: ICD-10-CM

## 2023-06-07 DIAGNOSIS — K70.31 ALCOHOLIC CIRRHOSIS OF LIVER WITH ASCITES: ICD-10-CM

## 2023-06-07 DIAGNOSIS — D50.0 ANEMIA: ICD-10-CM

## 2023-06-07 PROCEDURE — 99214 OFFICE O/P EST MOD 30 MIN: CPT | Performed by: PHYSICIAN ASSISTANT

## 2023-06-07 RX ORDER — CLONAZEPAM 0.5 MG/1
1 TABLET AT BEDTIME TABLET ORAL
Status: ACTIVE | COMMUNITY

## 2023-06-07 RX ORDER — SPIRONOLACTONE 100 MG/1
TAKE ONE TABLET BY MOUTH TWICE A DAY TABLET, FILM COATED ORAL
Qty: 60 TABLET | Refills: 0 | Status: ACTIVE | COMMUNITY

## 2023-06-07 RX ORDER — SERTRALINE 50 MG/1
1 TABLET TABLET, FILM COATED ORAL ONCE A DAY
Status: ACTIVE | COMMUNITY

## 2023-06-07 RX ORDER — FUROSEMIDE 40 MG/1
1 TABLET TABLET ORAL TWICE PER DAY
Refills: 11 | Status: ACTIVE | COMMUNITY
Start: 2021-06-21

## 2023-06-07 RX ORDER — SODIUM BICARBONATE 650 MG/1
TABLET ORAL
Qty: 90 TABLET | Status: ACTIVE | COMMUNITY

## 2023-06-07 RX ORDER — METOPROLOL TARTRATE 25 MG/1
1 TABLET WITH FOOD TABLET, FILM COATED ORAL TWICE A DAY
Status: ACTIVE | COMMUNITY

## 2023-06-07 RX ORDER — CHOLECALCIFEROL (VITAMIN D3) 125 MCG
AS DIRECTED TABLET ORAL
Status: ACTIVE | COMMUNITY

## 2023-06-07 RX ORDER — OMEPRAZOLE 40 MG/1
TAKE ONE CAPSULE BY MOUTH DAILY BEFORE A MEAL CAPSULE, DELAYED RELEASE ORAL
Qty: 90 CAPSULE | Refills: 0 | Status: ACTIVE | COMMUNITY

## 2023-06-07 RX ORDER — ALBUTEROL SULFATE 90 UG/1
1 PUFF AS NEEDED AEROSOL, METERED RESPIRATORY (INHALATION)
Status: ACTIVE | COMMUNITY

## 2023-06-07 NOTE — HPI-TODAY'S VISIT:
Pt presents for evaluation of decompensated alcoholic cirrhosis with ascites and edema.Pt accompanied by his aunt.   Since last visit, patient was hospitallized at [facility] from 5/10- 5/27/2023 for hematemesis and melena. Pt with reports of active drinking 5-6 beers/day.   CTA: No evidence of acute GI bleed. Liver cirrhosis with findings of portal hypertension, including moderate to large abdominal and pelvic ascites.  Right inguinal fluid containing hernia extending to the scrotum. Colonic diverticulosis, with no evidence of focal diverticulitis. Pt had EGD DR Lilly 5/10/23- Small esophageal varices without stigmata of bleeding, too small to be banded - Irregular Z line, R/O Suggs's esophagus. Biopsies not done due to GI bleeding - ? Trisha-Hubbard tear with erosion at GE junction, no bleeding at present time - Some old blood with small clots in stomach, lavaged. - Portal hypertensive gastropathy - Normal duodenum  Pt discharged 5/27 and was back to ER 5/29 with N/V and emesis. Denies bloody emesis. Pt hospitalized x 2 days and discharged.   Since hospital discharge, he is feeling better the exception of swelling in lower legs, abdomen and scrotum. He continues Spironolactone 50 mg BID and Lasix 20 mg qd. Pt denies bloody stools. Has BM 2 times/day with Lactulose. once/day. Pt denies taking Xifaxan.   Hgb low on hospital discharge - 7.6, plts 267, AST, ALT, T Bili normal. No recent INR to calculate MELD.

## 2023-06-08 ENCOUNTER — TELEPHONE ENCOUNTER (OUTPATIENT)
Dept: URBAN - METROPOLITAN AREA CLINIC 94 | Facility: CLINIC | Age: 43
End: 2023-06-08

## 2023-06-08 LAB
A/G RATIO: 0.8
ABSOLUTE BASOPHILS: 59
ABSOLUTE EOSINOPHILS: 755
ABSOLUTE LYMPHOCYTES: 925
ABSOLUTE MONOCYTES: 918
ABSOLUTE NEUTROPHILS: 4743
ALBUMIN: 2.9
ALKALINE PHOSPHATASE: 92
ALT (SGPT): 13
AST (SGOT): 31
BASOPHILS: 0.8
BILIRUBIN, TOTAL: 1.1
BUN/CREATININE RATIO: (no result)
BUN: 13
CALCIUM: 8.5
CARBON DIOXIDE, TOTAL: 21
CHLORIDE: 105
CREATININE: 0.93
EGFR: 105
EOSINOPHILS: 10.2
GLOBULIN, TOTAL: 3.7
GLUCOSE: 113
HEMATOCRIT: 26
HEMOGLOBIN: 8.4
INR: 1.1
IRON BIND.CAP.(TIBC): 315
IRON SATURATION: 17
IRON: 53
LYMPHOCYTES: 12.5
MCH: 29.7
MCHC: 32.3
MCV: 91.9
MONOCYTES: 12.4
MPV: 9.4
NEUTROPHILS: 64.1
PLATELET COUNT: 264
POTASSIUM: 4.3
PROTEIN, TOTAL: 6.6
PT: 11.5
RDW: 15.2
RED BLOOD CELL COUNT: 2.83
SODIUM: 137
WHITE BLOOD CELL COUNT: 7.4

## 2023-06-08 RX ORDER — FUROSEMIDE 40 MG/1
1 TABLET TABLET ORAL TWICE PER DAY
Qty: 60 | Refills: 3
Start: 2021-06-21

## 2023-06-08 RX ORDER — SPIRONOLACTONE 100 MG/1
1 TABLET TABLET, FILM COATED ORAL
Qty: 60 | Refills: 3

## 2023-06-09 ENCOUNTER — TELEPHONE ENCOUNTER (OUTPATIENT)
Dept: URBAN - METROPOLITAN AREA CLINIC 94 | Facility: CLINIC | Age: 43
End: 2023-06-09

## 2023-06-09 ENCOUNTER — LAB OUTSIDE AN ENCOUNTER (OUTPATIENT)
Dept: URBAN - METROPOLITAN AREA CLINIC 94 | Facility: CLINIC | Age: 43
End: 2023-06-09

## 2023-08-09 ENCOUNTER — OFFICE VISIT (OUTPATIENT)
Dept: URBAN - METROPOLITAN AREA CLINIC 94 | Facility: CLINIC | Age: 43
End: 2023-08-09
Payer: MEDICARE

## 2023-08-09 VITALS
HEART RATE: 94 BPM | DIASTOLIC BLOOD PRESSURE: 79 MMHG | HEIGHT: 71 IN | SYSTOLIC BLOOD PRESSURE: 121 MMHG | WEIGHT: 262 LBS | TEMPERATURE: 97.7 F | BODY MASS INDEX: 36.68 KG/M2

## 2023-08-09 DIAGNOSIS — K70.31 ALCOHOLIC CIRRHOSIS OF LIVER WITH ASCITES: ICD-10-CM

## 2023-08-09 DIAGNOSIS — K76.82 HEPATIC ENCEPHALOPATHY: ICD-10-CM

## 2023-08-09 DIAGNOSIS — D50.8 ACHLORHYDRIC ANEMIA: ICD-10-CM

## 2023-08-09 DIAGNOSIS — K76.6 PORTAL HYPERTENSION: ICD-10-CM

## 2023-08-09 PROBLEM — 34742003: Status: ACTIVE | Noted: 2023-08-09

## 2023-08-09 PROCEDURE — 99214 OFFICE O/P EST MOD 30 MIN: CPT | Performed by: PHYSICIAN ASSISTANT

## 2023-08-09 RX ORDER — ALBUTEROL SULFATE 90 UG/1
1 PUFF AS NEEDED AEROSOL, METERED RESPIRATORY (INHALATION)
Status: ACTIVE | COMMUNITY

## 2023-08-09 RX ORDER — SERTRALINE 50 MG/1
1 TABLET TABLET, FILM COATED ORAL ONCE A DAY
Status: ACTIVE | COMMUNITY

## 2023-08-09 RX ORDER — LACTULOSE 10 G/15ML
30 ML DAILY SOLUTION ORAL ONCE A DAY
Qty: 2700 ML | OUTPATIENT
Start: 2023-08-09 | End: 2023-11-06

## 2023-08-09 RX ORDER — OMEPRAZOLE 40 MG/1
TAKE ONE CAPSULE BY MOUTH DAILY BEFORE A MEAL CAPSULE, DELAYED RELEASE ORAL
Qty: 90 CAPSULE | Refills: 0 | Status: ACTIVE | COMMUNITY

## 2023-08-09 RX ORDER — CLONAZEPAM 0.5 MG/1
1 TABLET AT BEDTIME TABLET ORAL
Status: ACTIVE | COMMUNITY

## 2023-08-09 RX ORDER — METOPROLOL TARTRATE 25 MG/1
1 TABLET WITH FOOD TABLET, FILM COATED ORAL TWICE A DAY
Status: ACTIVE | COMMUNITY

## 2023-08-09 RX ORDER — CHOLECALCIFEROL (VITAMIN D3) 125 MCG
AS DIRECTED TABLET ORAL
Status: ACTIVE | COMMUNITY

## 2023-08-09 RX ORDER — SPIRONOLACTONE 100 MG/1
1 TABLET TABLET, FILM COATED ORAL
Qty: 60 | Refills: 3 | Status: ACTIVE | COMMUNITY

## 2023-08-09 RX ORDER — FUROSEMIDE 40 MG/1
1 TABLET TABLET ORAL TWICE PER DAY
Qty: 60 | Refills: 3 | Status: ACTIVE | COMMUNITY
Start: 2021-06-21

## 2023-08-09 NOTE — HPI-TODAY'S VISIT:
Pt presents for evaluation of decompensated alcoholic cirrhosis with ascites and edema.Pt accompanied by his mother.   Since last visit , patient overall is feeling much better. He has had a 30 lb wt loss since last visit, and recent abdominal US revealed small vol ascites and no LVP performed. Pt continue Lasix 40 mg BID and Spironolactone 100 mg BID. He continue Lactulose 30 ml qd with 1-2 BMs daily. Denies hematemesis, melena or hematochezia. Pt is scheduled for EGD this Friday with DR Lilly. Will have CBC, CMP, INR today. Pt does admit to active drinking 12 beers/week  Since last visit, patient was hospitallized at [facility] from 5/10- 5/27/2023 for hematemesis and melena. Pt with reports of active drinking 5-6 beers/day.   CTA: No evidence of acute GI bleed. Liver cirrhosis with findings of portal hypertension, including moderate to large abdominal and pelvic ascites.  Right inguinal fluid containing hernia extending to the scrotum. Colonic diverticulosis, with no evidence of focal diverticulitis. Pt had EGD DR Lilly 5/10/23- Small esophageal varices without stigmata of bleeding, too small to be banded - Irregular Z line, R/O Suggs's esophagus. Biopsies not done due to GI bleeding - ? Trisha-Hubbard tear with erosion at GE junction, no bleeding at present time - Some old blood with small clots in stomach, lavaged. - Portal hypertensive gastropathy - Normal duodenum  Pt discharged 5/27 and was back to ER 5/29 with N/V and emesis. Denies bloody emesis. Pt hospitalized x 2 days and discharged.

## 2023-08-09 NOTE — PHYSICAL EXAM GASTROINTESTINAL
Abdomen , soft, mildly tender, nondistended , no guarding or rigidity , no masses palpable , normal bowel sounds , Liver and Spleen , no hepatomegaly present , no hepatosplenomegaly , liver nontender , spleen not palpable

## 2023-08-11 ENCOUNTER — OFFICE VISIT (OUTPATIENT)
Dept: URBAN - METROPOLITAN AREA MEDICAL CENTER 34 | Facility: MEDICAL CENTER | Age: 43
End: 2023-08-11
Payer: MEDICARE

## 2023-08-11 DIAGNOSIS — K31.89 OTHER DISEASES OF STOMACH AND DUODENUM: ICD-10-CM

## 2023-08-11 DIAGNOSIS — K76.6 CLINICALLY SIGNIFICANT PORTAL HYPERTENSION: ICD-10-CM

## 2023-08-11 DIAGNOSIS — I85.10 ESOPH VARICE OTHER DIS: ICD-10-CM

## 2023-08-11 DIAGNOSIS — K31.811 ACQUIRED ARTERIOVENOUS MALFORMATION OF DUODENUM WITH HEMORRHAGE: ICD-10-CM

## 2023-08-11 PROCEDURE — 43255 EGD CONTROL BLEEDING ANY: CPT | Performed by: INTERNAL MEDICINE

## 2023-08-11 RX ORDER — SERTRALINE 50 MG/1
1 TABLET TABLET, FILM COATED ORAL ONCE A DAY
Status: ACTIVE | COMMUNITY

## 2023-08-11 RX ORDER — CHOLECALCIFEROL (VITAMIN D3) 125 MCG
AS DIRECTED TABLET ORAL
Status: ACTIVE | COMMUNITY

## 2023-08-11 RX ORDER — FUROSEMIDE 40 MG/1
1 TABLET TABLET ORAL TWICE PER DAY
Qty: 60 | Refills: 3 | Status: ACTIVE | COMMUNITY
Start: 2021-06-21

## 2023-08-11 RX ORDER — ALBUTEROL SULFATE 90 UG/1
1 PUFF AS NEEDED AEROSOL, METERED RESPIRATORY (INHALATION)
Status: ACTIVE | COMMUNITY

## 2023-08-11 RX ORDER — LACTULOSE 10 G/15ML
30 ML DAILY SOLUTION ORAL ONCE A DAY
Qty: 2700 ML | Status: ACTIVE | COMMUNITY
Start: 2023-08-09 | End: 2023-11-06

## 2023-08-11 RX ORDER — METOPROLOL TARTRATE 25 MG/1
1 TABLET WITH FOOD TABLET, FILM COATED ORAL TWICE A DAY
Status: ACTIVE | COMMUNITY

## 2023-08-11 RX ORDER — SPIRONOLACTONE 100 MG/1
1 TABLET TABLET, FILM COATED ORAL
Qty: 60 | Refills: 3 | Status: ACTIVE | COMMUNITY

## 2023-08-11 RX ORDER — CLONAZEPAM 0.5 MG/1
1 TABLET AT BEDTIME TABLET ORAL
Status: ACTIVE | COMMUNITY

## 2023-08-11 RX ORDER — OMEPRAZOLE 40 MG/1
TAKE ONE CAPSULE BY MOUTH DAILY BEFORE A MEAL CAPSULE, DELAYED RELEASE ORAL
Qty: 90 CAPSULE | Refills: 0 | Status: ACTIVE | COMMUNITY

## 2023-08-23 ENCOUNTER — OFFICE VISIT (OUTPATIENT)
Dept: URBAN - METROPOLITAN AREA CLINIC 94 | Facility: CLINIC | Age: 43
End: 2023-08-23
Payer: MEDICARE

## 2023-08-23 ENCOUNTER — LAB OUTSIDE AN ENCOUNTER (OUTPATIENT)
Dept: URBAN - METROPOLITAN AREA CLINIC 94 | Facility: CLINIC | Age: 43
End: 2023-08-23

## 2023-08-23 VITALS
HEIGHT: 71 IN | BODY MASS INDEX: 36.96 KG/M2 | HEART RATE: 103 BPM | TEMPERATURE: 97.5 F | WEIGHT: 264 LBS | SYSTOLIC BLOOD PRESSURE: 129 MMHG | DIASTOLIC BLOOD PRESSURE: 77 MMHG

## 2023-08-23 DIAGNOSIS — K31.89 OTHER DISEASES OF STOMACH AND DUODENUM: ICD-10-CM

## 2023-08-23 DIAGNOSIS — K70.31 ALCOHOLIC CIRRHOSIS OF LIVER WITH ASCITES: ICD-10-CM

## 2023-08-23 DIAGNOSIS — K76.6 PORTAL HYPERTENSION: ICD-10-CM

## 2023-08-23 DIAGNOSIS — K76.82 HEPATIC ENCEPHALOPATHY: ICD-10-CM

## 2023-08-23 PROCEDURE — 99214 OFFICE O/P EST MOD 30 MIN: CPT | Performed by: PHYSICIAN ASSISTANT

## 2023-08-23 RX ORDER — METOPROLOL TARTRATE 25 MG/1
1 TABLET WITH FOOD TABLET, FILM COATED ORAL TWICE A DAY
Status: ACTIVE | COMMUNITY

## 2023-08-23 RX ORDER — FUROSEMIDE 40 MG/1
1 TABLET TABLET ORAL TWICE PER DAY
Qty: 60 | Refills: 3 | Status: ACTIVE | COMMUNITY
Start: 2021-06-21

## 2023-08-23 RX ORDER — CLONAZEPAM 0.5 MG/1
1 TABLET AT BEDTIME TABLET ORAL
Status: ACTIVE | COMMUNITY

## 2023-08-23 RX ORDER — ALBUTEROL SULFATE 90 UG/1
1 PUFF AS NEEDED AEROSOL, METERED RESPIRATORY (INHALATION)
Status: ACTIVE | COMMUNITY

## 2023-08-23 RX ORDER — OMEPRAZOLE 40 MG/1
TAKE ONE CAPSULE BY MOUTH DAILY BEFORE A MEAL CAPSULE, DELAYED RELEASE ORAL
Qty: 90 CAPSULE | Refills: 0 | Status: ACTIVE | COMMUNITY

## 2023-08-23 RX ORDER — CHOLECALCIFEROL (VITAMIN D3) 125 MCG
AS DIRECTED TABLET ORAL
Status: ACTIVE | COMMUNITY

## 2023-08-23 RX ORDER — LACTULOSE 10 G/15ML
30 ML DAILY SOLUTION ORAL ONCE A DAY
Qty: 2700 ML | Status: ACTIVE | COMMUNITY
Start: 2023-08-09 | End: 2023-11-06

## 2023-08-23 RX ORDER — SERTRALINE 50 MG/1
1 TABLET TABLET, FILM COATED ORAL ONCE A DAY
Status: ACTIVE | COMMUNITY

## 2023-08-23 RX ORDER — CARVEDILOL 3.12 MG/1
1 TABLET WITH FOOD TABLET, FILM COATED ORAL TWICE A DAY
Qty: 60 | Refills: 3 | OUTPATIENT
Start: 2023-08-23

## 2023-08-23 RX ORDER — SPIRONOLACTONE 100 MG/1
1 TABLET TABLET, FILM COATED ORAL
Qty: 60 | Refills: 3 | Status: ACTIVE | COMMUNITY

## 2023-08-23 NOTE — HPI-TODAY'S VISIT:
Pt presents for evaluation of decompensated alcoholic cirrhosis with ascites and edema returns today for post procedure f/u visit.Pt accompanied by his mother.   EGD - Grade I esophageal varices without stigmata of bleeding, too small to be banded Irregular Z line at 35 cm, could not be biopsied due to esophageal varices Large hiatal hernia Severe portal hypertensive gastropathy Gastric Antral Vascular Ectasia with oozing. Cauterized with APC Normal duodenum. Recommended Carvediolol, PPI daily and repeat EGD in 3 mos   Labs prior to procedure - MELD 10  Hgb 9.8  Pt continue Lasix 40 mg BID and Spironolactone 100 mg BID. He continue Lactulose 30 ml qd with 1-2 BMs daily. Denies hematemesis, melena or hematochezia.Pt does admit to active drinking 12 beers/week  [facility] from 5/10- 5/27/2023 for hematemesis and melena. Pt with reports of active drinking 5-6 beers/day.  CTA: No evidence of acute GI bleed. Liver cirrhosis with findings of portal hypertension, including moderate to large abdominal and pelvic ascites.  Right inguinal fluid containing hernia extending to the scrotum. Colonic diverticulosis, with no evidence of focal diverticulitis.  EGD DR Lilly 5/10/23- Small esophageal varices without stigmata of bleeding, too small to be banded - Irregular Z line, R/O Suggs's esophagus. Biopsies not done due to GI bleeding - ? Trisha-Hubbard tear with erosion at GE junction, no bleeding at present time - Some old blood with small clots in stomach, lavaged. - Portal hypertensive gastropathy - Normal duodenum  Pt discharged 5/27 and was back to ER 5/29 with N/V and emesis. Denies bloody emesis. Pt hospitalized x 2 days and discharged.

## 2023-08-30 ENCOUNTER — OFFICE VISIT (OUTPATIENT)
Dept: URBAN - METROPOLITAN AREA CLINIC 94 | Facility: CLINIC | Age: 43
End: 2023-08-30

## 2023-09-01 ENCOUNTER — ERX REFILL RESPONSE (OUTPATIENT)
Dept: URBAN - METROPOLITAN AREA CLINIC 52 | Facility: CLINIC | Age: 43
End: 2023-09-01

## 2023-09-01 RX ORDER — OMEPRAZOLE 40 MG/1
TAKE ONE CAPSULE BY MOUTH DAILY BEFORE A MEAL CAPSULE, DELAYED RELEASE ORAL
Qty: 90 CAPSULE | Refills: 0 | OUTPATIENT

## 2023-09-29 ENCOUNTER — TELEPHONE ENCOUNTER (OUTPATIENT)
Dept: URBAN - METROPOLITAN AREA CLINIC 94 | Facility: CLINIC | Age: 43
End: 2023-09-29

## 2023-11-21 ENCOUNTER — OFFICE VISIT (OUTPATIENT)
Dept: URBAN - METROPOLITAN AREA MEDICAL CENTER 34 | Facility: MEDICAL CENTER | Age: 43
End: 2023-11-21
Payer: MEDICARE

## 2023-11-21 DIAGNOSIS — K31.819 ACQUIRED ARTERIOVENOUS MALFORMATION OF DUODENUM: ICD-10-CM

## 2023-11-21 DIAGNOSIS — K76.6 CLINICALLY SIGNIFICANT PORTAL HYPERTENSION: ICD-10-CM

## 2023-11-21 DIAGNOSIS — I85.10 ESOPH VARICE OTHER DIS: ICD-10-CM

## 2023-11-21 DIAGNOSIS — K31.89 OTHER DISEASES OF STOMACH AND DUODENUM: ICD-10-CM

## 2023-11-21 PROCEDURE — 43239 EGD BIOPSY SINGLE/MULTIPLE: CPT | Performed by: INTERNAL MEDICINE

## 2023-11-21 RX ORDER — SPIRONOLACTONE 100 MG/1
1 TABLET TABLET, FILM COATED ORAL
Qty: 60 | Refills: 3 | Status: ACTIVE | COMMUNITY

## 2023-11-21 RX ORDER — ALBUTEROL SULFATE 90 UG/1
1 PUFF AS NEEDED AEROSOL, METERED RESPIRATORY (INHALATION)
Status: ACTIVE | COMMUNITY

## 2023-11-21 RX ORDER — METOPROLOL TARTRATE 25 MG/1
1 TABLET WITH FOOD TABLET, FILM COATED ORAL TWICE A DAY
Status: ACTIVE | COMMUNITY

## 2023-11-21 RX ORDER — CHOLECALCIFEROL (VITAMIN D3) 125 MCG
AS DIRECTED TABLET ORAL
Status: ACTIVE | COMMUNITY

## 2023-11-21 RX ORDER — SERTRALINE 50 MG/1
1 TABLET TABLET, FILM COATED ORAL ONCE A DAY
Status: ACTIVE | COMMUNITY

## 2023-11-21 RX ORDER — OMEPRAZOLE 40 MG/1
TAKE ONE CAPSULE BY MOUTH DAILY BEFORE A MEAL CAPSULE, DELAYED RELEASE ORAL
Qty: 90 CAPSULE | Refills: 0 | Status: ACTIVE | COMMUNITY

## 2023-11-21 RX ORDER — CARVEDILOL 3.12 MG/1
1 TABLET WITH FOOD TABLET, FILM COATED ORAL TWICE A DAY
Qty: 60 | Refills: 3 | Status: ACTIVE | COMMUNITY
Start: 2023-08-23

## 2023-11-21 RX ORDER — FUROSEMIDE 40 MG/1
1 TABLET TABLET ORAL TWICE PER DAY
Qty: 60 | Refills: 3 | Status: ACTIVE | COMMUNITY
Start: 2021-06-21

## 2023-11-21 RX ORDER — CLONAZEPAM 0.5 MG/1
1 TABLET AT BEDTIME TABLET ORAL
Status: ACTIVE | COMMUNITY

## 2023-12-01 ENCOUNTER — ERX REFILL RESPONSE (OUTPATIENT)
Dept: URBAN - METROPOLITAN AREA CLINIC 52 | Facility: CLINIC | Age: 43
End: 2023-12-01

## 2023-12-01 RX ORDER — OMEPRAZOLE 40 MG/1
TAKE ONE CAPSULE BY MOUTH DAILY BEFORE A MEAL CAPSULE, DELAYED RELEASE ORAL
Qty: 90 CAPSULE | Refills: 0 | OUTPATIENT

## 2023-12-12 ENCOUNTER — OFFICE VISIT (OUTPATIENT)
Dept: URBAN - METROPOLITAN AREA CLINIC 94 | Facility: CLINIC | Age: 43
End: 2023-12-12

## 2023-12-18 ENCOUNTER — ERX REFILL RESPONSE (OUTPATIENT)
Dept: URBAN - METROPOLITAN AREA CLINIC 94 | Facility: CLINIC | Age: 43
End: 2023-12-18

## 2023-12-18 RX ORDER — CARVEDILOL 3.12 MG/1
1 TABLET WITH FOOD TABLET, FILM COATED ORAL TWICE A DAY
Qty: 60 | Refills: 3 | OUTPATIENT

## 2023-12-18 RX ORDER — CARVEDILOL 3.12 MG/1
TAKE ONE TABLET BY MOUTH TWICE A DAY WITH FOOD TABLET, FILM COATED ORAL
Qty: 60 TABLET | Refills: 6 | OUTPATIENT

## 2024-01-04 ENCOUNTER — OFFICE VISIT (OUTPATIENT)
Dept: URBAN - METROPOLITAN AREA CLINIC 94 | Facility: CLINIC | Age: 44
End: 2024-01-04

## 2024-01-10 ENCOUNTER — OFFICE VISIT (OUTPATIENT)
Dept: URBAN - METROPOLITAN AREA CLINIC 94 | Facility: CLINIC | Age: 44
End: 2024-01-10
Payer: MEDICARE

## 2024-01-10 ENCOUNTER — LAB OUTSIDE AN ENCOUNTER (OUTPATIENT)
Dept: URBAN - METROPOLITAN AREA CLINIC 94 | Facility: CLINIC | Age: 44
End: 2024-01-10

## 2024-01-10 VITALS
TEMPERATURE: 97.9 F | BODY MASS INDEX: 39.2 KG/M2 | WEIGHT: 280 LBS | HEART RATE: 118 BPM | DIASTOLIC BLOOD PRESSURE: 65 MMHG | SYSTOLIC BLOOD PRESSURE: 117 MMHG | HEIGHT: 71 IN

## 2024-01-10 DIAGNOSIS — K70.31 ALCOHOLIC CIRRHOSIS OF LIVER WITH ASCITES: ICD-10-CM

## 2024-01-10 PROCEDURE — 99214 OFFICE O/P EST MOD 30 MIN: CPT | Performed by: INTERNAL MEDICINE

## 2024-01-10 RX ORDER — ALBUTEROL SULFATE 90 UG/1
1 PUFF AS NEEDED AEROSOL, METERED RESPIRATORY (INHALATION)
Status: ACTIVE | COMMUNITY

## 2024-01-10 RX ORDER — SERTRALINE 50 MG/1
1 TABLET TABLET, FILM COATED ORAL ONCE A DAY
Status: ACTIVE | COMMUNITY

## 2024-01-10 RX ORDER — OMEPRAZOLE 40 MG/1
TAKE ONE CAPSULE BY MOUTH DAILY BEFORE A MEAL CAPSULE, DELAYED RELEASE ORAL
Qty: 90 CAPSULE | Refills: 0 | Status: ACTIVE | COMMUNITY

## 2024-01-10 RX ORDER — CHOLECALCIFEROL (VITAMIN D3) 125 MCG
AS DIRECTED TABLET ORAL
Status: ACTIVE | COMMUNITY

## 2024-01-10 RX ORDER — FUROSEMIDE 40 MG/1
1 TABLET TABLET ORAL TWICE PER DAY
Qty: 60 | Refills: 3 | Status: ACTIVE | COMMUNITY
Start: 2021-06-21

## 2024-01-10 RX ORDER — CARVEDILOL 3.12 MG/1
TAKE ONE TABLET BY MOUTH TWICE A DAY WITH FOOD TABLET, FILM COATED ORAL
Qty: 60 TABLET | Refills: 6 | Status: ACTIVE | COMMUNITY

## 2024-01-10 RX ORDER — SPIRONOLACTONE 100 MG/1
1 TABLET TABLET, FILM COATED ORAL
Qty: 60 | Refills: 3 | Status: ACTIVE | COMMUNITY

## 2024-01-10 RX ORDER — CLONAZEPAM 0.5 MG/1
1 TABLET AT BEDTIME TABLET ORAL
Status: ACTIVE | COMMUNITY

## 2024-01-10 NOTE — HPI-TODAY'S VISIT:
44 y/o M with hx of decompensated alcoholic cirrhosis with ascites here for f/u, patient of Valeria Gaona  Patient doing well today. Is s/p EGD. He has had paracentesis this Oct/Nov/Dec once/month with 3-4L removed each time. Is on Lasix 40 mg BID and Spironolactone 100 mg BID. No longer is taking lactulose, denies confusion Has cut down on alcohol but still drink 3x/week upto 9-10 beers/week  Patient admitted to [facility] in May 2023 for hematemesis and melena.CTA no active bleed but did confirm cirrhosis with large ascites. Had EGD then with question of MVT(did have small varices, PHG). 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 Latest EGD 11/2023  EGD 11/2023: Small G1EV, irregular Z line(no biopsied given varices), 2cm HH, mild-mod PHG, mild GAVE with no stigmata, duodenal erythema/nodularity. Biopsies with gastric heterotropia. Repeat in 1 year CTA 05/2023: cirrhosis, large ascites, diverticulosis, right inguinal fluid hernia to scrotum EGD May 2023, August 2023

## 2024-01-11 ENCOUNTER — TELEPHONE ENCOUNTER (OUTPATIENT)
Dept: URBAN - METROPOLITAN AREA CLINIC 94 | Facility: CLINIC | Age: 44
End: 2024-01-11

## 2024-01-11 PROBLEM — 87522002: Status: ACTIVE | Noted: 2024-01-11

## 2024-01-11 LAB
A/G RATIO: 0.9
ALBUMIN: 3.6
ALKALINE PHOSPHATASE: 104
ALT (SGPT): 16
AST (SGOT): 29
BILIRUBIN, TOTAL: 1.8
BUN/CREATININE RATIO: (no result)
BUN: 15
CALCIUM: 8.8
CARBON DIOXIDE, TOTAL: 22
CHLORIDE: 100
CREATININE: 1.13
EGFR: 83
GLOBULIN, TOTAL: 3.8
GLUCOSE: 176
HEMATOCRIT: 26.2
HEMOGLOBIN: 8.1
MCH: 24.8
MCHC: 30.9
MCV: 80.1
MPV: 9.4
PLATELET COUNT: 244
POTASSIUM: 3.6
PROTEIN, TOTAL: 7.4
RDW: 16.2
RED BLOOD CELL COUNT: 3.27
SODIUM: 132
WHITE BLOOD CELL COUNT: 7.8

## 2024-01-11 RX ORDER — POLYETHYLENE GLYCOL-3350 AND ELECTROLYTES WITH FLAVOR PACK 240; 5.84; 2.98; 6.72; 22.72 G/278.26G; G/278.26G; G/278.26G; G/278.26G; G/278.26G
AS DIRECTED POWDER, FOR SOLUTION ORAL ONCE
Qty: 1 | Refills: 0 | OUTPATIENT
Start: 2024-01-11 | End: 2024-01-12

## 2024-01-17 ENCOUNTER — TELEPHONE ENCOUNTER (OUTPATIENT)
Dept: URBAN - METROPOLITAN AREA CLINIC 94 | Facility: CLINIC | Age: 44
End: 2024-01-17

## 2024-02-20 ENCOUNTER — COLON (OUTPATIENT)
Dept: URBAN - METROPOLITAN AREA MEDICAL CENTER 34 | Facility: MEDICAL CENTER | Age: 44
End: 2024-02-20
Payer: MEDICARE

## 2024-02-20 DIAGNOSIS — K74.60 ADVANCED CIRRHOSIS: ICD-10-CM

## 2024-02-20 PROCEDURE — 45378 DIAGNOSTIC COLONOSCOPY: CPT | Performed by: INTERNAL MEDICINE

## 2024-02-20 RX ORDER — OMEPRAZOLE 40 MG/1
TAKE ONE CAPSULE BY MOUTH DAILY BEFORE A MEAL CAPSULE, DELAYED RELEASE ORAL
Qty: 90 CAPSULE | Refills: 0 | Status: ACTIVE | COMMUNITY

## 2024-02-20 RX ORDER — CHOLECALCIFEROL (VITAMIN D3) 125 MCG
AS DIRECTED TABLET ORAL
Status: ACTIVE | COMMUNITY

## 2024-02-20 RX ORDER — SERTRALINE 50 MG/1
1 TABLET TABLET, FILM COATED ORAL ONCE A DAY
Status: ACTIVE | COMMUNITY

## 2024-02-20 RX ORDER — CLONAZEPAM 0.5 MG/1
1 TABLET AT BEDTIME TABLET ORAL
Status: ACTIVE | COMMUNITY

## 2024-02-20 RX ORDER — FUROSEMIDE 40 MG/1
TAKE 1 TABLET BY MOUTH TWICE A DAY TABLET ORAL
Qty: 60 TABLET | Refills: 6 | Status: ACTIVE | COMMUNITY

## 2024-02-20 RX ORDER — ALBUTEROL SULFATE 90 UG/1
1 PUFF AS NEEDED AEROSOL, METERED RESPIRATORY (INHALATION)
Status: ACTIVE | COMMUNITY

## 2024-02-20 RX ORDER — CARVEDILOL 3.12 MG/1
TAKE ONE TABLET BY MOUTH TWICE A DAY WITH FOOD TABLET, FILM COATED ORAL
Qty: 60 TABLET | Refills: 6 | Status: ACTIVE | COMMUNITY

## 2024-02-20 RX ORDER — SPIRONOLACTONE 100 MG/1
1 TABLET TABLET, FILM COATED ORAL
Qty: 60 | Refills: 3 | Status: ACTIVE | COMMUNITY

## 2024-05-15 ENCOUNTER — OFFICE VISIT (OUTPATIENT)
Dept: URBAN - METROPOLITAN AREA CLINIC 94 | Facility: CLINIC | Age: 44
End: 2024-05-15

## 2024-05-22 ENCOUNTER — DASHBOARD ENCOUNTERS (OUTPATIENT)
Age: 44
End: 2024-05-22

## 2024-05-22 ENCOUNTER — LAB OUTSIDE AN ENCOUNTER (OUTPATIENT)
Dept: URBAN - METROPOLITAN AREA CLINIC 94 | Facility: CLINIC | Age: 44
End: 2024-05-22

## 2024-05-22 ENCOUNTER — OFFICE VISIT (OUTPATIENT)
Dept: URBAN - METROPOLITAN AREA CLINIC 94 | Facility: CLINIC | Age: 44
End: 2024-05-22
Payer: MEDICARE

## 2024-05-22 VITALS
WEIGHT: 279 LBS | OXYGEN SATURATION: 92 % | HEIGHT: 71 IN | TEMPERATURE: 97.5 F | BODY MASS INDEX: 39.06 KG/M2 | HEART RATE: 103 BPM | DIASTOLIC BLOOD PRESSURE: 79 MMHG | SYSTOLIC BLOOD PRESSURE: 117 MMHG

## 2024-05-22 DIAGNOSIS — K76.6 PORTAL HYPERTENSION: ICD-10-CM

## 2024-05-22 DIAGNOSIS — K70.31 ALCOHOLIC CIRRHOSIS OF LIVER WITH ASCITES: ICD-10-CM

## 2024-05-22 DIAGNOSIS — D50.9 IRON DEFICIENCY ANEMIA, UNSPECIFIED IRON DEFICIENCY ANEMIA TYPE: ICD-10-CM

## 2024-05-22 PROCEDURE — 99214 OFFICE O/P EST MOD 30 MIN: CPT | Performed by: PHYSICIAN ASSISTANT

## 2024-05-22 RX ORDER — OMEPRAZOLE 40 MG/1
TAKE ONE CAPSULE BY MOUTH DAILY BEFORE A MEAL CAPSULE, DELAYED RELEASE ORAL
Qty: 90 CAPSULE | Refills: 0 | Status: ACTIVE | COMMUNITY

## 2024-05-22 RX ORDER — CLONAZEPAM 0.5 MG/1
1 TABLET AT BEDTIME TABLET ORAL
Status: ACTIVE | COMMUNITY

## 2024-05-22 RX ORDER — SPIRONOLACTONE 100 MG/1
1 TABLET TABLET, FILM COATED ORAL
Qty: 60 | Refills: 3 | Status: ACTIVE | COMMUNITY

## 2024-05-22 RX ORDER — CHOLECALCIFEROL (VITAMIN D3) 125 MCG
AS DIRECTED TABLET ORAL
Status: ACTIVE | COMMUNITY

## 2024-05-22 RX ORDER — SERTRALINE 50 MG/1
1 TABLET TABLET, FILM COATED ORAL ONCE A DAY
Status: ACTIVE | COMMUNITY

## 2024-05-22 RX ORDER — FUROSEMIDE 40 MG/1
TAKE 1 TABLET BY MOUTH TWICE A DAY TABLET ORAL
Qty: 60 TABLET | Refills: 6 | Status: ACTIVE | COMMUNITY

## 2024-05-22 RX ORDER — SODIUM BICARBONATE TAB 325 MG 325 MG
AS DIRECTED TAB ORAL
Status: ACTIVE | COMMUNITY

## 2024-05-22 RX ORDER — ALBUTEROL SULFATE 90 UG/1
1 PUFF AS NEEDED AEROSOL, METERED RESPIRATORY (INHALATION)
Status: ACTIVE | COMMUNITY

## 2024-05-22 RX ORDER — CARVEDILOL 3.12 MG/1
TAKE ONE TABLET BY MOUTH TWICE A DAY WITH FOOD TABLET, FILM COATED ORAL
Qty: 60 TABLET | Refills: 6 | Status: ACTIVE | COMMUNITY

## 2024-05-22 RX ORDER — FERROUS SULFATE 325(65) MG
1 TABLET TABLET ORAL
Status: ACTIVE | COMMUNITY

## 2024-05-23 LAB
A/G RATIO: 0.9
ABSOLUTE BASOPHILS: 68
ABSOLUTE EOSINOPHILS: 252
ABSOLUTE LYMPHOCYTES: 748
ABSOLUTE MONOCYTES: 836
ABSOLUTE NEUTROPHILS: 4896
ALBUMIN: 3.6
ALKALINE PHOSPHATASE: 105
ALT (SGPT): 15
AST (SGOT): 30
BASOPHILS: 1
BILIRUBIN, TOTAL: 2.2
BUN/CREATININE RATIO: (no result)
BUN: 11
CALCIUM: 9.1
CARBON DIOXIDE, TOTAL: 25
CHLORIDE: 97
CREATININE: 1.26
EGFR: 73
EOSINOPHILS: 3.7
GLOBULIN, TOTAL: 4.1
GLUCOSE: 172
HEMATOCRIT: 32.7
HEMOGLOBIN: 10.2
INR: 1.1
IRON BIND.CAP.(TIBC): 418
IRON SATURATION: 35
IRON: 145
LYMPHOCYTES: 11
MCH: 27.2
MCHC: 31.2
MCV: 87.2
MONOCYTES: 12.3
MPV: 8.7
NEUTROPHILS: 72
PLATELET COUNT: 183
POTASSIUM: 3.4
PROTEIN, TOTAL: 7.7
PT: 11.9
RDW: 17.4
RED BLOOD CELL COUNT: 3.75
SODIUM: 134
WHITE BLOOD CELL COUNT: 6.8

## 2024-06-02 ENCOUNTER — ERX REFILL RESPONSE (OUTPATIENT)
Dept: URBAN - METROPOLITAN AREA CLINIC 52 | Facility: CLINIC | Age: 44
End: 2024-06-02

## 2024-06-02 RX ORDER — OMEPRAZOLE 40 MG/1
TAKE ONE CAPSULE BY MOUTH DAILY BEFORE A MEAL CAPSULE, DELAYED RELEASE ORAL
Qty: 90 CAPSULE | Refills: 0 | OUTPATIENT

## 2024-07-08 ENCOUNTER — OFFICE VISIT (OUTPATIENT)
Dept: URBAN - METROPOLITAN AREA CLINIC 93 | Facility: CLINIC | Age: 44
End: 2024-07-08
Payer: COMMERCIAL

## 2024-07-08 DIAGNOSIS — D50.9 ANEMIA: ICD-10-CM

## 2024-07-08 PROCEDURE — 91110 GI TRC IMG INTRAL ESOPH-ILE: CPT | Performed by: INTERNAL MEDICINE

## 2024-07-08 RX ORDER — FUROSEMIDE 40 MG/1
TAKE 1 TABLET BY MOUTH TWICE A DAY TABLET ORAL
Qty: 60 TABLET | Refills: 6 | Status: ACTIVE | COMMUNITY

## 2024-07-08 RX ORDER — FERROUS SULFATE 325(65) MG
1 TABLET TABLET ORAL
Status: ACTIVE | COMMUNITY

## 2024-07-08 RX ORDER — OMEPRAZOLE 40 MG/1
TAKE ONE CAPSULE BY MOUTH DAILY BEFORE A MEAL CAPSULE, DELAYED RELEASE ORAL
Qty: 90 CAPSULE | Refills: 0 | Status: ACTIVE | COMMUNITY

## 2024-07-08 RX ORDER — CHOLECALCIFEROL (VITAMIN D3) 125 MCG
AS DIRECTED TABLET ORAL
Status: ACTIVE | COMMUNITY

## 2024-07-08 RX ORDER — SPIRONOLACTONE 100 MG/1
1 TABLET TABLET, FILM COATED ORAL
Qty: 60 | Refills: 3 | Status: ACTIVE | COMMUNITY

## 2024-07-08 RX ORDER — CARVEDILOL 3.12 MG/1
TAKE ONE TABLET BY MOUTH TWICE A DAY WITH FOOD TABLET, FILM COATED ORAL
Qty: 60 TABLET | Refills: 6 | Status: ACTIVE | COMMUNITY

## 2024-07-08 RX ORDER — CLONAZEPAM 0.5 MG/1
1 TABLET AT BEDTIME TABLET ORAL
Status: ACTIVE | COMMUNITY

## 2024-07-08 RX ORDER — SODIUM BICARBONATE TAB 325 MG 325 MG
AS DIRECTED TAB ORAL
Status: ACTIVE | COMMUNITY

## 2024-07-08 RX ORDER — SERTRALINE 50 MG/1
1 TABLET TABLET, FILM COATED ORAL ONCE A DAY
Status: ACTIVE | COMMUNITY

## 2024-07-08 RX ORDER — ALBUTEROL SULFATE 90 UG/1
1 PUFF AS NEEDED AEROSOL, METERED RESPIRATORY (INHALATION)
Status: ACTIVE | COMMUNITY

## 2024-08-20 ENCOUNTER — TELEPHONE ENCOUNTER (OUTPATIENT)
Dept: URBAN - METROPOLITAN AREA CLINIC 94 | Facility: CLINIC | Age: 44
End: 2024-08-20

## 2024-08-21 ENCOUNTER — LAB OUTSIDE AN ENCOUNTER (OUTPATIENT)
Dept: URBAN - METROPOLITAN AREA CLINIC 94 | Facility: CLINIC | Age: 44
End: 2024-08-21

## 2024-08-30 ENCOUNTER — ERX REFILL RESPONSE (OUTPATIENT)
Dept: URBAN - METROPOLITAN AREA CLINIC 52 | Facility: CLINIC | Age: 44
End: 2024-08-30

## 2024-08-30 RX ORDER — OMEPRAZOLE 40 MG/1
TAKE ONE CAPSULE BY MOUTH DAILY BEFORE A MEAL CAPSULE, DELAYED RELEASE ORAL
Qty: 90 CAPSULE | Refills: 0 | OUTPATIENT

## 2024-09-23 ENCOUNTER — OFFICE VISIT (OUTPATIENT)
Dept: URBAN - METROPOLITAN AREA CLINIC 94 | Facility: CLINIC | Age: 44
End: 2024-09-23

## 2024-10-03 ENCOUNTER — LAB OUTSIDE AN ENCOUNTER (OUTPATIENT)
Dept: URBAN - METROPOLITAN AREA CLINIC 94 | Facility: CLINIC | Age: 44
End: 2024-10-03

## 2024-10-03 ENCOUNTER — OFFICE VISIT (OUTPATIENT)
Dept: URBAN - METROPOLITAN AREA CLINIC 94 | Facility: CLINIC | Age: 44
End: 2024-10-03
Payer: COMMERCIAL

## 2024-10-03 VITALS
SYSTOLIC BLOOD PRESSURE: 108 MMHG | HEIGHT: 71 IN | TEMPERATURE: 97.9 F | WEIGHT: 258 LBS | BODY MASS INDEX: 36.12 KG/M2 | HEART RATE: 84 BPM | DIASTOLIC BLOOD PRESSURE: 69 MMHG | OXYGEN SATURATION: 95 %

## 2024-10-03 DIAGNOSIS — K70.31 ALCOHOLIC CIRRHOSIS OF LIVER WITH ASCITES: ICD-10-CM

## 2024-10-03 DIAGNOSIS — K76.6 PORTAL HYPERTENSION: ICD-10-CM

## 2024-10-03 DIAGNOSIS — D50.8 ACHLORHYDRIC ANEMIA: ICD-10-CM

## 2024-10-03 PROCEDURE — 99214 OFFICE O/P EST MOD 30 MIN: CPT | Performed by: INTERNAL MEDICINE

## 2024-10-03 RX ORDER — FERROUS SULFATE 325(65) MG
1 TABLET TABLET ORAL
Status: ACTIVE | COMMUNITY

## 2024-10-03 RX ORDER — CHOLECALCIFEROL (VITAMIN D3) 125 MCG
AS DIRECTED TABLET ORAL
Status: ACTIVE | COMMUNITY

## 2024-10-03 RX ORDER — SODIUM BICARBONATE TAB 325 MG 325 MG
AS DIRECTED TAB ORAL
Status: ACTIVE | COMMUNITY

## 2024-10-03 RX ORDER — SPIRONOLACTONE 100 MG/1
1 TABLET TABLET, FILM COATED ORAL
Qty: 60 | Refills: 3 | Status: ACTIVE | COMMUNITY

## 2024-10-03 RX ORDER — FUROSEMIDE 40 MG/1
TAKE 1 TABLET BY MOUTH TWICE A DAY TABLET ORAL
Qty: 60 TABLET | Refills: 6 | Status: ACTIVE | COMMUNITY

## 2024-10-03 RX ORDER — CARVEDILOL 3.12 MG/1
TAKE ONE TABLET BY MOUTH TWICE A DAY WITH FOOD TABLET, FILM COATED ORAL
Qty: 60 TABLET | Refills: 6 | Status: ACTIVE | COMMUNITY

## 2024-10-03 RX ORDER — CLONAZEPAM 0.5 MG/1
1 TABLET AT BEDTIME TABLET ORAL
Status: ACTIVE | COMMUNITY

## 2024-10-03 RX ORDER — ALBUTEROL SULFATE 90 UG/1
1 PUFF AS NEEDED AEROSOL, METERED RESPIRATORY (INHALATION)
Status: ACTIVE | COMMUNITY

## 2024-10-03 RX ORDER — OMEPRAZOLE 40 MG/1
TAKE ONE CAPSULE BY MOUTH DAILY BEFORE A MEAL CAPSULE, DELAYED RELEASE ORAL
Qty: 90 CAPSULE | Refills: 0 | Status: ACTIVE | COMMUNITY

## 2024-10-03 RX ORDER — SERTRALINE 50 MG/1
1 TABLET TABLET, FILM COATED ORAL ONCE A DAY
Status: ACTIVE | COMMUNITY

## 2024-10-03 NOTE — PHYSICAL EXAM MUSCULOSKELETAL:
How Severe Is Your Skin Lesion?: mild
normal gait and station , no tenderness or deformities present
Have Your Skin Lesions Been Treated?: not been treated
Is This A New Presentation, Or A Follow-Up?: Skin Lesions
Additional History: Patient has seen the two lesions in question change color over last few weeks and is concerned. She is here for focused visit.

## 2024-10-03 NOTE — PHYSICAL EXAM CONSTITUTIONAL:
ill appearing, well developed, well nourished, in no acute distress, ambulating without difficulty, normal communication ability, multiple telangiectasia on face

## 2024-10-03 NOTE — HPI-TODAY'S VISIT:
43 y/o M with hx of decompensated alcoholic cirrhosis with ascites   Pill Cam 7/2024- revealed multiple non- bleeding AVMs in SB.  Iron levels normal 5/2024 Hgb 10.2 (5/2024) MELD 16   Today patient denies ever seeing hematologist due to anemia. Denies melena or hematochezia. Still endorses ETOH use 6-12 natrual light/week. No desire to stop now and feels like he is doing well. Does feel thjat a medication that is taking is causing epigastric pain. ?? iron   BMs are generally normal as baseline 3 solid stools/day. No Lactulose and denies confusion.  He is on oral iron Rx by Dr Perez.   Pt had paracentesis 2 weeks ago- 3 L. He continues paracentesis monthly. Continues Lasix 40 mg BID and Spironolactone 100 mg BID.  Patient admitted to [facility] in May 2023 for hematemesis and melena.CTA no active bleed but did confirm cirrhosis with large ascites. Had EGD then with question of MVT(did have small varices, PHG). 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 Latest EGD 11/2023  Colonoscopy 2/2024-  Medium IH otherwise normal  EGD 11/2023: Small G1EV, irregular Z line(no biopsied given varices), 2cm HH, mild-mod PHG, mild GAVE with no stigmata, duodenal erythema/nodularity. Biopsies with gastric heterotropia. Repeat in 1 year CTA 05/2023: cirrhosis, large ascites, diverticulosis, right inguinal fluid hernia to scrotum EGD May 2023, August 2023

## 2024-10-04 LAB
A/G RATIO: 0.8
ABSOLUTE BASOPHILS: 80
ABSOLUTE EOSINOPHILS: 273
ABSOLUTE LYMPHOCYTES: 1081
ABSOLUTE MONOCYTES: 696
ABSOLUTE NEUTROPHILS: 2571
AFP, SERUM, TUMOR MARKER: 10.6
ALBUMIN: 3.3
ALKALINE PHOSPHATASE: 145
ALT (SGPT): 19
AST (SGOT): 48
BASOPHILS: 1.7
BILIRUBIN, TOTAL: 1.9
BUN/CREATININE RATIO: (no result)
BUN: 7
CALCIUM: 8.6
CARBON DIOXIDE, TOTAL: 28
CHLORIDE: 90
CREATININE: 1.04
EGFR: 91
EOSINOPHILS: 5.8
GLOBULIN, TOTAL: 4.4
GLUCOSE: 163
HEMATOCRIT: 31.5
HEMOGLOBIN: 10
INR: 1.1
LYMPHOCYTES: 23
MCH: 31.9
MCHC: 31.7
MCV: 100.6
MONOCYTES: 14.8
MPV: 9
NEUTROPHILS: 54.7
PLATELET COUNT: 132
POTASSIUM: 3.2
PROTEIN, TOTAL: 7.7
PT: 11.6
RDW: 14.3
RED BLOOD CELL COUNT: 3.13
SODIUM: 131
WHITE BLOOD CELL COUNT: 4.7

## 2024-10-05 ENCOUNTER — LAB OUTSIDE AN ENCOUNTER (OUTPATIENT)
Dept: URBAN - METROPOLITAN AREA CLINIC 94 | Facility: CLINIC | Age: 44
End: 2024-10-05

## 2024-10-05 ENCOUNTER — TELEPHONE ENCOUNTER (OUTPATIENT)
Dept: URBAN - METROPOLITAN AREA CLINIC 94 | Facility: CLINIC | Age: 44
End: 2024-10-05

## 2024-10-31 ENCOUNTER — LAB OUTSIDE AN ENCOUNTER (OUTPATIENT)
Dept: URBAN - METROPOLITAN AREA CLINIC 94 | Facility: CLINIC | Age: 44
End: 2024-10-31

## 2024-10-31 ENCOUNTER — TELEPHONE ENCOUNTER (OUTPATIENT)
Dept: URBAN - METROPOLITAN AREA CLINIC 94 | Facility: CLINIC | Age: 44
End: 2024-10-31

## 2024-11-20 ENCOUNTER — LAB OUTSIDE AN ENCOUNTER (OUTPATIENT)
Dept: URBAN - METROPOLITAN AREA CLINIC 94 | Facility: CLINIC | Age: 44
End: 2024-11-20

## 2024-11-20 ENCOUNTER — TELEPHONE ENCOUNTER (OUTPATIENT)
Dept: URBAN - METROPOLITAN AREA CLINIC 94 | Facility: CLINIC | Age: 44
End: 2024-11-20

## 2024-11-28 ENCOUNTER — ERX REFILL RESPONSE (OUTPATIENT)
Dept: URBAN - METROPOLITAN AREA CLINIC 52 | Facility: CLINIC | Age: 44
End: 2024-11-28

## 2024-11-28 RX ORDER — OMEPRAZOLE 40 MG/1
TAKE ONE CAPSULE BY MOUTH DAILY BEFORE A MEAL CAPSULE, DELAYED RELEASE ORAL
Qty: 90 CAPSULE | Refills: 0 | OUTPATIENT

## 2024-12-03 ENCOUNTER — WEB ENCOUNTER (OUTPATIENT)
Dept: URBAN - METROPOLITAN AREA CLINIC 94 | Facility: CLINIC | Age: 44
End: 2024-12-03

## 2024-12-05 ENCOUNTER — WEB ENCOUNTER (OUTPATIENT)
Dept: URBAN - METROPOLITAN AREA CLINIC 94 | Facility: CLINIC | Age: 44
End: 2024-12-05

## 2024-12-30 ENCOUNTER — WEB ENCOUNTER (OUTPATIENT)
Dept: URBAN - METROPOLITAN AREA CLINIC 94 | Facility: CLINIC | Age: 44
End: 2024-12-30

## 2024-12-31 ENCOUNTER — LAB OUTSIDE AN ENCOUNTER (OUTPATIENT)
Dept: URBAN - METROPOLITAN AREA CLINIC 94 | Facility: CLINIC | Age: 44
End: 2024-12-31

## 2025-01-01 ENCOUNTER — CLAIMS CREATED FROM THE CLAIM WINDOW (OUTPATIENT)
Dept: URBAN - METROPOLITAN AREA MEDICAL CENTER 34 | Facility: MEDICAL CENTER | Age: 45
End: 2025-01-01

## 2025-01-01 ENCOUNTER — TELEPHONE ENCOUNTER (OUTPATIENT)
Dept: URBAN - METROPOLITAN AREA CLINIC 94 | Facility: CLINIC | Age: 45
End: 2025-01-01

## 2025-01-01 ENCOUNTER — LAB OUTSIDE AN ENCOUNTER (OUTPATIENT)
Dept: URBAN - METROPOLITAN AREA CLINIC 94 | Facility: CLINIC | Age: 45
End: 2025-01-01

## 2025-01-01 PROCEDURE — 43235 EGD DIAGNOSTIC BRUSH WASH: CPT | Performed by: INTERNAL MEDICINE

## 2025-01-28 ENCOUNTER — LAB OUTSIDE AN ENCOUNTER (OUTPATIENT)
Dept: URBAN - METROPOLITAN AREA CLINIC 94 | Facility: CLINIC | Age: 45
End: 2025-01-28

## 2025-01-28 ENCOUNTER — WEB ENCOUNTER (OUTPATIENT)
Dept: URBAN - METROPOLITAN AREA CLINIC 94 | Facility: CLINIC | Age: 45
End: 2025-01-28

## 2025-02-06 ENCOUNTER — OFFICE VISIT (OUTPATIENT)
Dept: URBAN - METROPOLITAN AREA CLINIC 94 | Facility: CLINIC | Age: 45
End: 2025-02-06

## 2025-02-26 ENCOUNTER — ERX REFILL RESPONSE (OUTPATIENT)
Dept: URBAN - METROPOLITAN AREA CLINIC 52 | Facility: CLINIC | Age: 45
End: 2025-02-26

## 2025-02-26 RX ORDER — OMEPRAZOLE 40 MG/1
TAKE ONE CAPSULE BY MOUTH DAILY BEFORE A MEAL CAPSULE, DELAYED RELEASE ORAL
Qty: 90 CAPSULE | Refills: 0 | OUTPATIENT